# Patient Record
Sex: FEMALE | Race: WHITE | NOT HISPANIC OR LATINO | Employment: OTHER | URBAN - METROPOLITAN AREA
[De-identification: names, ages, dates, MRNs, and addresses within clinical notes are randomized per-mention and may not be internally consistent; named-entity substitution may affect disease eponyms.]

---

## 2017-01-05 ENCOUNTER — ALLSCRIPTS OFFICE VISIT (OUTPATIENT)
Dept: OTHER | Facility: OTHER | Age: 82
End: 2017-01-05

## 2017-05-24 ENCOUNTER — LAB CONVERSION - ENCOUNTER (OUTPATIENT)
Dept: OTHER | Facility: OTHER | Age: 82
End: 2017-05-24

## 2017-05-24 LAB
25(OH)D3 SERPL-MCNC: 35 NG/ML (ref 30–100)
A/G RATIO (HISTORICAL): 1.6 (CALC) (ref 1–2.5)
ALBUMIN SERPL BCP-MCNC: 4.5 G/DL (ref 3.6–5.1)
ALP SERPL-CCNC: 77 U/L (ref 33–130)
ALT SERPL W P-5'-P-CCNC: 11 U/L (ref 6–29)
AST SERPL W P-5'-P-CCNC: 17 U/L (ref 10–35)
BASOPHILS # BLD AUTO: 0.6 %
BASOPHILS # BLD AUTO: 50 CELLS/UL (ref 0–200)
BILIRUB SERPL-MCNC: 0.5 MG/DL (ref 0.2–1.2)
BUN SERPL-MCNC: 17 MG/DL (ref 7–25)
BUN/CREA RATIO (HISTORICAL): 18 (CALC) (ref 6–22)
CALCIUM SERPL-MCNC: 9.8 MG/DL (ref 8.6–10.4)
CHLORIDE SERPL-SCNC: 104 MMOL/L (ref 98–110)
CHOLEST SERPL-MCNC: 283 MG/DL (ref 125–200)
CHOLEST/HDLC SERPL: 4 (CALC)
CO2 SERPL-SCNC: 26 MMOL/L (ref 20–31)
CREAT SERPL-MCNC: 0.97 MG/DL (ref 0.6–0.88)
DEPRECATED RDW RBC AUTO: 13.3 % (ref 11–15)
EGFR AFRICAN AMERICAN (HISTORICAL): 64 ML/MIN/1.73M2
EGFR-AMERICAN CALC (HISTORICAL): 55 ML/MIN/1.73M2
EOSINOPHIL # BLD AUTO: 1.1 %
EOSINOPHIL # BLD AUTO: 92 CELLS/UL (ref 15–500)
GAMMA GLOBULIN (HISTORICAL): 2.8 G/DL (CALC) (ref 1.9–3.7)
GLUCOSE (HISTORICAL): 101 MG/DL (ref 65–99)
HCT VFR BLD AUTO: 44 % (ref 35–45)
HDLC SERPL-MCNC: 70 MG/DL
HGB BLD-MCNC: 14.2 G/DL (ref 11.7–15.5)
LDL CHOLESTEROL (HISTORICAL): 189 MG/DL (CALC)
LYMPHOCYTES # BLD AUTO: 2167 CELLS/UL (ref 850–3900)
LYMPHOCYTES # BLD AUTO: 25.8 %
MCH RBC QN AUTO: 30.1 PG (ref 27–33)
MCHC RBC AUTO-ENTMCNC: 32.3 G/DL (ref 32–36)
MCV RBC AUTO: 93.2 FL (ref 80–100)
MONOCYTES # BLD AUTO: 521 CELLS/UL (ref 200–950)
MONOCYTES (HISTORICAL): 6.2 %
NEUTROPHILS # BLD AUTO: 5569 CELLS/UL (ref 1500–7800)
NEUTROPHILS # BLD AUTO: 66.3 %
NON-HDL-CHOL (CHOL-HDL) (HISTORICAL): 213 MG/DL (CALC)
PLATELET # BLD AUTO: 212 THOUSAND/UL (ref 140–400)
PMV BLD AUTO: 10.5 FL (ref 7.5–12.5)
POTASSIUM SERPL-SCNC: 4.4 MMOL/L (ref 3.5–5.3)
RBC # BLD AUTO: 4.73 MILLION/UL (ref 3.8–5.1)
SODIUM SERPL-SCNC: 140 MMOL/L (ref 135–146)
TOTAL PROTEIN (HISTORICAL): 7.3 G/DL (ref 6.1–8.1)
TRIGL SERPL-MCNC: 119 MG/DL
TSH SERPL DL<=0.05 MIU/L-ACNC: 1.26 MIU/L (ref 0.4–4.5)
WBC # BLD AUTO: 8.4 THOUSAND/UL (ref 3.8–10.8)

## 2017-06-05 DIAGNOSIS — M85.80 OTHER SPECIFIED DISORDERS OF BONE DENSITY AND STRUCTURE, UNSPECIFIED SITE: ICD-10-CM

## 2017-06-05 DIAGNOSIS — E78.5 HYPERLIPIDEMIA: ICD-10-CM

## 2017-06-05 DIAGNOSIS — I10 ESSENTIAL (PRIMARY) HYPERTENSION: ICD-10-CM

## 2017-06-05 DIAGNOSIS — Z13.21 ENCOUNTER FOR SCREENING FOR NUTRITIONAL DISORDER: ICD-10-CM

## 2017-07-12 ENCOUNTER — ALLSCRIPTS OFFICE VISIT (OUTPATIENT)
Dept: OTHER | Facility: OTHER | Age: 82
End: 2017-07-12

## 2017-08-22 ENCOUNTER — APPOINTMENT (OUTPATIENT)
Dept: LAB | Facility: MEDICAL CENTER | Age: 82
End: 2017-08-22
Payer: MEDICARE

## 2017-08-22 DIAGNOSIS — E78.5 HYPERLIPIDEMIA: ICD-10-CM

## 2017-08-22 LAB
CHOLEST SERPL-MCNC: 246 MG/DL (ref 50–200)
HDLC SERPL-MCNC: 66 MG/DL (ref 40–60)
LDLC SERPL CALC-MCNC: 159 MG/DL (ref 0–100)
TRIGL SERPL-MCNC: 104 MG/DL

## 2017-08-22 PROCEDURE — 80061 LIPID PANEL: CPT

## 2017-08-22 PROCEDURE — 36415 COLL VENOUS BLD VENIPUNCTURE: CPT

## 2017-09-01 DIAGNOSIS — E78.5 HYPERLIPIDEMIA: ICD-10-CM

## 2017-10-26 ENCOUNTER — ALLSCRIPTS OFFICE VISIT (OUTPATIENT)
Dept: OTHER | Facility: OTHER | Age: 82
End: 2017-10-26

## 2017-10-26 DIAGNOSIS — F17.200 NICOTINE DEPENDENCE, UNCOMPLICATED: ICD-10-CM

## 2017-10-27 NOTE — PROGRESS NOTES
Assessment  1  Carotid artery stenosis (433 10) (I65 29)   2  Hyperlipidemia (272 4) (E78 5)   3  Hypertension (401 9) (I10)    Plan  Carotid ultrasound  CT scan lung  Recheck 6 months  Discussion/Summary    1  Hypertension-controlledHyperlipidemia-did show significant decrease in her cholesterol and LDL with her dietary changes  Will continue to monitor  Cannot cannot tolerate statins  Carotid artery disease-due for ultrasoundCigarette smoker-advised to quit  Discussed CT lung cancer screening  HM-declines immunizations  Chief Complaint  The patient is here today for a follow up  History of Present Illness  George Avery says she richa been trying to watch her diet to lower cholesterol   She cut out ice cream and baked goods  She is eating oatmeal everyday  Lost some weight  She stays very active  is seeing a  and enjoying this relationship very much  Has been dancing smoking  Active Problems  1  Anaphylaxis Due To Bee Venom (995 0)   2  Arthritis of knee (716 96) (M17 10)   3  Carotid artery stenosis (433 10) (I65 29)   4  Carotid bruit (785 9) (R09 89)   5  Cerebral arterial aneurysm (437 3) (I67 1)   6  Cognitive complaints (799 59) (R41 9)   7  Contact dermatitis due to plant (692 6) (L25 5)   8  Eczema (692 9) (L30 9)   9  Encounter for screening mammogram for malignant neoplasm of breast (V76 12)   (Z12 31)   10  Glaucoma screening (V80 1) (Z13 5)   11  Hyperlipidemia (272 4) (E78 5)   12  Hypertension (401 9) (I10)   13  Osteopenia (733 90) (M85 80)   14  Poison ivy dermatitis (692 6) (L23 7)   15  Right knee pain (719 46) (M25 561)   16  Skin rash (782 1) (R21)   17  Unspecified Muscle Strain (848 9)    Past Medical History  1  History of Encounter for vitamin deficiency screening (V77 99) (Z13 21)   2  History of Thyroid disorder screen (V77 0) (Z13 29)    Surgical History  1  History of Colonoscopy (Fiberoptic)    Family History  Mother    1   Family history of Family Health Status Of Mother -    2  Family history of Heart Disease (V17 49)  Father    3  Family history of Family Health Status Of Father -     Social History   · Denied: History of Alcohol Use (History)   · Caffeine Use   · Current every day smoker (305 1) (F17 200)    Current Meds   1  AmLODIPine Besylate 10 MG Oral Tablet; TAKE 1 TABLET DAILY; Therapy: 46VAH3654 to (Tara Dyer)  Requested for: 53EXH9495; Last   Rx:[id number] Ordered   2  Aspirin EC Lo-Dose 81 MG TBEC; Take 1 tablet daily Recorded   3  Caltrate 600 Plus-Vit D TABS; Take as directed; Therapy: (Orlean Art) to Recorded   4  HM Fish Oil 1200 MG Oral Capsule; Take as directed Recorded   5  Multivitamins Oral Capsule; TAKE 1 CAPSULE Daily; Therapy: (Recorded:2014) to Recorded    Allergies  1  Ezetimibe TABS   2  Codeine Sulfate TABS   3  Penicillins  4  Bee sting    Vitals  Vital Signs    Recorded: 42IPT2616 01:59PM   Heart Rate 64   Respiration 18   Systolic 578   Diastolic 64   Weight 773 lb 4 oz   BMI Calculated 24 96   BSA Calculated 1 6     Physical Exam    Constitutional   General appearance: No acute distress, well appearing and well nourished  Neck   Neck: Supple, symmetric, trachea midline, no masses  Thyroid: Normal, no thyromegaly  Pulmonary   Respiratory effort: Abnormal  -- Decreased breath sounds  Auscultation of lungs: Clear to auscultation  Cardiovascular   Auscultation of heart: Normal rate and rhythm, normal S1 and S2, no murmurs  Results/Data  (1) LIPID PANEL, FASTING [id number] 10:23AM Dot Smith Order Number: [id number]     Test Name Result Flag Reference   CHOLESTEROL 246 mg/dL H    HDL,DIRECT 66 mg/dL H 40-60   Specimen collection should occur prior to Metamizole administration due to the potential for falsley depressed results     LDL CHOLESTEROL CALCULATED 159 mg/dL H 0-100   Triglyceride:        Normal ??? ??? ??? ??? ??? ??? ??? <150 mg/dl   ??? ??? ???Borderline High ??? ??? 150-199 mg/dl   ??? ??? ? ?? High ??? ??? ??? ??? ??? ??? ??? 200-499 mg/dl   ??? ??? ? ??Very High ??? ??? ??? ??? ??? >499 mg/dl      Cholesterol:       Desirable ??? ??? ??? ??? <200 mg/dl   ??? ??? Borderline High ??? 200-239 mg/dl   ??? ??? High ??? ??? ??? ??? ??? ??? >239 mg/dl      HDL Cholesterol:       High ??? ???>59 mg/dL   ??? ??? Low ??? ??? <41 mg/dL      This screening LDL is a calculated result  It does not have the accuracy of the Direct Measured LDL in the monitoring of patients with hyperlipidemia and/or statin therapy  Direct Measure LDL (GEN628) must be ordered separately in these patients  TRIGLYCERIDES 104 mg/dL  <=150   Specimen collection should occur prior to N-Acetylcysteine or Metamizole administration due to the potential for falsely depressed results  Future Appointments    Date/Time Provider Specialty Site   04/26/2018 02:00 PM SOHAIL Arora   [address]     Signatures   Electronically signed by : SOHAIL Adkins ; Oct 26 2017 10:31PM EST                       (Author)

## 2017-11-14 ENCOUNTER — ALLSCRIPTS OFFICE VISIT (OUTPATIENT)
Dept: OTHER | Facility: OTHER | Age: 82
End: 2017-11-14

## 2017-11-15 NOTE — PROGRESS NOTES
Assessment    1  Nasal congestion (478 19) (R09 81)    Plan  Nasal congestion    · Follow-up PRN Evaluation and Treatment  Follow-up  Status: Complete  Done:14Nov2017    Discussion/Summary    Suspect allergies based on symptoms and exam  Recommended patient avoid decongestants such as Sudafed  Recommended patient start Claritin 10 mg daily  Follow-up in 2 weeks if symptoms persist or sooner if needed  Possible side effects of new medications were reviewed with the patient/guardian today  The treatment plan was reviewed with the patient/guardian  The patient/guardian understands and agrees with the treatment plan      Chief Complaint  Patient is here today with complaints of nasal symptoms and a cough  Symptoms started a few weeks ago but does not seem to be getting better  History of Present Illness  HPI: Patient with a couple week history of runny nose, sneezing, cough, nasal congestion and watery eyes  Patient states she has been in and out of the house doing yard work and housework  Review of Systems   Constitutional: no fever  Cardiovascular: no chest pain  Respiratory: no shortness of breath  Active Problems    1  Anaphylaxis Due To Bee Venom (995 0)   2  Arthritis of knee (716 96) (M17 10)   3  Carotid artery stenosis (433 10) (I65 29)   4  Carotid bruit (785 9) (R09 89)   5  Cerebral arterial aneurysm (437 3) (I67 1)   6  Cognitive complaints (799 59) (R41 9)   7  Contact dermatitis due to plant (692 6) (L25 5)   8  Eczema (692 9) (L30 9)   9  Encounter for screening mammogram for malignant neoplasm of breast (V76 12) (Z12 31)   10  Glaucoma screening (V80 1) (Z13 5)   11  Hyperlipidemia (272 4) (E78 5)   12  Hypertension (401 9) (I10)   13  Osteopenia (733 90) (M85 80)   14  Poison ivy dermatitis (692 6) (L23 7)   15  Right knee pain (719 46) (M25 561)   16  Skin rash (782 1) (R21)   17  Unspecified Muscle Strain (848 9)    Past Medical History  1   History of Encounter for vitamin deficiency screening (V77 99) (Z13 21)   2  History of Thyroid disorder screen (V77 0) (Z13 29)  Active Problems And Past Medical History Reviewed: The active problems and past medical history were reviewed and updated today  Family History  Mother    1  Family history of Family Health Status Of Mother -    2  Family history of Heart Disease (V17 49)  Father    3  Family history of Family Health Status Of Father -     Social History   · Denied: History of Alcohol Use (History)   · Caffeine Use   · Current every day smoker (305 1) (F17 200)  The social history was reviewed and updated today  Surgical History    1  History of Colonoscopy (Fiberoptic)    Current Meds   1  AmLODIPine Besylate 10 MG Oral Tablet; TAKE 1 TABLET DAILY; Therapy: 46CHX8293 to (87 89 79)  Requested for: 88GQP9500; Last Rx:2017 Ordered   2  Aspirin EC Lo-Dose 81 MG TBEC; Take 1 tablet daily Recorded   3  Caltrate 600 Plus-Vit D TABS; Take as directed; Therapy: (Liam Lucero) to Recorded   4  HM Fish Oil 1200 MG Oral Capsule; Take as directed Recorded   5  Multivitamins Oral Capsule; TAKE 1 CAPSULE Daily; Therapy: (Recorded:2014) to Recorded    The medication list was reviewed and updated today  Allergies  1  Ezetimibe TABS   2  Codeine Sulfate TABS   3  Penicillins  4  Bee sting    Vitals   Recorded: 62FAF1155 01:34PM   Heart Rate 68   Respiration 18   Systolic 834   Diastolic 70   Weight 835 lb    BMI Calculated 24 91   BSA Calculated 1 6   O2 Saturation 96       Physical Exam   Constitutional  General appearance: No acute distress, well appearing and well nourished     Ears, Nose, Mouth, and Throat  External inspection of ears and nose: Normal    Otoscopic examination: Abnormal   The right tympanic membrane was normal  The left tympanic membrane was normal  The right external canal was normal  The left external canal was normal  Exam of the right middle ear showed a middle ear effusion that was serous  Exam of the left middle ear showed a middle ear effusion that was serous  Nasal mucosa, septum, and turbinates: Abnormal   There was clear rhinorrhea from both nares  The bilateral nasal mucosa was edematous, but-- not pale/blue-- and-- not red  Oropharynx: Abnormal   The posterior pharynx Postnasal drainage  , but-- was not erythematous-- and-- did not have an exudate  Pulmonary  Respiratory effort: No increased work of breathing or signs of respiratory distress  Auscultation of lungs: Clear to auscultation  Cardiovascular  Auscultation of heart: Normal rate and rhythm, normal S1 and S2, without murmurs  Examination of extremities for edema and/or varicosities: Normal    Lymphatic  Palpation of lymph nodes in neck: No lymphadenopathy  Musculoskeletal  Gait and station: Normal          Future Appointments    Date/Time Provider Specialty Site   04/26/2018 02:00 PM SOHAIL Kruse   [address] [facility]       Signatures   Electronically signed by : Vic Walker DO; Nov 14 2017  2:00PM EST                       (Author)

## 2018-01-10 NOTE — RESULT NOTES
Message   Right knee arthritis  F/U w/ PCP as recommended  Verified Results  * XR KNEE 3 VIEW RIGHT 13Jun2016 11:49AM Tara Oakley Order Number: [id number]     Test Name Result Flag Reference   XR KNEE 3 VW RIGHT (Report)     RIGHT KNEE     INDICATION: Right knee pain  COMPARISON: None     VIEWS: 3; 3 images     FINDINGS:     There is no acute fracture or dislocation  There is no joint effusion  Miniscule superior patellar osteophyte  No lytic or blastic lesions are seen  Soft tissues are unremarkable  IMPRESSION:     No acute osseous abnormality  Minimal degenerative changes         Workstation performed: [id number]     Signed by:   Nora Marti, DO   6/13/16

## 2018-01-12 VITALS
WEIGHT: 134.25 LBS | RESPIRATION RATE: 18 BRPM | HEART RATE: 64 BPM | DIASTOLIC BLOOD PRESSURE: 64 MMHG | SYSTOLIC BLOOD PRESSURE: 128 MMHG | BODY MASS INDEX: 24.16 KG/M2

## 2018-01-13 VITALS
BODY MASS INDEX: 24.91 KG/M2 | DIASTOLIC BLOOD PRESSURE: 62 MMHG | HEART RATE: 86 BPM | WEIGHT: 134 LBS | RESPIRATION RATE: 18 BRPM | SYSTOLIC BLOOD PRESSURE: 124 MMHG

## 2018-01-13 VITALS
BODY MASS INDEX: 25.99 KG/M2 | HEIGHT: 62 IN | HEART RATE: 90 BPM | SYSTOLIC BLOOD PRESSURE: 130 MMHG | WEIGHT: 141.25 LBS | DIASTOLIC BLOOD PRESSURE: 80 MMHG | RESPIRATION RATE: 18 BRPM

## 2018-01-13 VITALS
OXYGEN SATURATION: 96 % | BODY MASS INDEX: 24.91 KG/M2 | RESPIRATION RATE: 18 BRPM | HEART RATE: 68 BPM | WEIGHT: 134 LBS | DIASTOLIC BLOOD PRESSURE: 70 MMHG | SYSTOLIC BLOOD PRESSURE: 136 MMHG

## 2018-02-16 ENCOUNTER — TELEPHONE (OUTPATIENT)
Dept: FAMILY MEDICINE CLINIC | Facility: MEDICAL CENTER | Age: 83
End: 2018-02-16

## 2018-02-16 ENCOUNTER — HOSPITAL ENCOUNTER (INPATIENT)
Facility: HOSPITAL | Age: 83
LOS: 3 days | Discharge: HOME HEALTH | DRG: 346 | End: 2018-02-19
Attending: EMERGENCY MEDICINE | Admitting: SURGERY
Payer: MEDICARE

## 2018-02-16 ENCOUNTER — APPOINTMENT (EMERGENCY)
Dept: CT IMAGING | Facility: HOSPITAL | Age: 83
DRG: 346 | End: 2018-02-16
Payer: MEDICARE

## 2018-02-16 DIAGNOSIS — F17.200 SMOKING: ICD-10-CM

## 2018-02-16 DIAGNOSIS — I10 ESSENTIAL HYPERTENSION: Chronic | ICD-10-CM

## 2018-02-16 DIAGNOSIS — K61.1 PERIRECTAL ABSCESS: ICD-10-CM

## 2018-02-16 DIAGNOSIS — A41.9 SEPSIS (HCC): ICD-10-CM

## 2018-02-16 DIAGNOSIS — K61.0 PERIANAL ABSCESS: Primary | ICD-10-CM

## 2018-02-16 LAB
ABO GROUP BLD: NORMAL
ALBUMIN SERPL BCP-MCNC: 3.4 G/DL (ref 3.5–5)
ALP SERPL-CCNC: 78 U/L (ref 46–116)
ALT SERPL W P-5'-P-CCNC: 14 U/L (ref 12–78)
ANION GAP SERPL CALCULATED.3IONS-SCNC: 11 MMOL/L (ref 4–13)
APTT PPP: 28 SECONDS (ref 23–35)
AST SERPL W P-5'-P-CCNC: 13 U/L (ref 5–45)
BACTERIA UR QL AUTO: ABNORMAL /HPF
BASOPHILS # BLD AUTO: 0.04 THOUSANDS/ΜL (ref 0–0.1)
BASOPHILS NFR BLD AUTO: 0 % (ref 0–1)
BILIRUB SERPL-MCNC: 0.4 MG/DL (ref 0.2–1)
BILIRUB UR QL STRIP: NEGATIVE
BLD GP AB SCN SERPL QL: NEGATIVE
BUN SERPL-MCNC: 15 MG/DL (ref 5–25)
CALCIUM SERPL-MCNC: 9.1 MG/DL (ref 8.3–10.1)
CHLORIDE SERPL-SCNC: 103 MMOL/L (ref 100–108)
CLARITY UR: CLEAR
CO2 SERPL-SCNC: 26 MMOL/L (ref 21–32)
COLOR UR: YELLOW
CREAT SERPL-MCNC: 1.07 MG/DL (ref 0.6–1.3)
EOSINOPHIL # BLD AUTO: 0.01 THOUSAND/ΜL (ref 0–0.61)
EOSINOPHIL NFR BLD AUTO: 0 % (ref 0–6)
ERYTHROCYTE [DISTWIDTH] IN BLOOD BY AUTOMATED COUNT: 12.6 % (ref 11.6–15.1)
GFR SERPL CREATININE-BSD FRML MDRD: 49 ML/MIN/1.73SQ M
GLUCOSE SERPL-MCNC: 118 MG/DL (ref 65–140)
GLUCOSE UR STRIP-MCNC: NEGATIVE MG/DL
HCT VFR BLD AUTO: 41.9 % (ref 34.8–46.1)
HGB BLD-MCNC: 13.3 G/DL (ref 11.5–15.4)
HGB UR QL STRIP.AUTO: ABNORMAL
INR PPP: 0.99 (ref 0.86–1.16)
KETONES UR STRIP-MCNC: NEGATIVE MG/DL
LACTATE SERPL-SCNC: 0.9 MMOL/L (ref 0.5–2)
LEUKOCYTE ESTERASE UR QL STRIP: NEGATIVE
LYMPHOCYTES # BLD AUTO: 0.91 THOUSANDS/ΜL (ref 0.6–4.47)
LYMPHOCYTES NFR BLD AUTO: 5 % (ref 14–44)
MCH RBC QN AUTO: 29.6 PG (ref 26.8–34.3)
MCHC RBC AUTO-ENTMCNC: 31.7 G/DL (ref 31.4–37.4)
MCV RBC AUTO: 93 FL (ref 82–98)
MONOCYTES # BLD AUTO: 1.19 THOUSAND/ΜL (ref 0.17–1.22)
MONOCYTES NFR BLD AUTO: 7 % (ref 4–12)
NEUTROPHILS # BLD AUTO: 14.57 THOUSANDS/ΜL (ref 1.85–7.62)
NEUTS SEG NFR BLD AUTO: 87 % (ref 43–75)
NITRITE UR QL STRIP: POSITIVE
NON-SQ EPI CELLS URNS QL MICRO: ABNORMAL /HPF
NRBC BLD AUTO-RTO: 0 /100 WBCS
PH UR STRIP.AUTO: 5.5 [PH] (ref 4.5–8)
PLATELET # BLD AUTO: 178 THOUSANDS/UL (ref 149–390)
PLATELET # BLD AUTO: 199 THOUSANDS/UL (ref 149–390)
PMV BLD AUTO: 10.9 FL (ref 8.9–12.7)
PMV BLD AUTO: 11.3 FL (ref 8.9–12.7)
POTASSIUM SERPL-SCNC: 3.6 MMOL/L (ref 3.5–5.3)
PROT SERPL-MCNC: 7.2 G/DL (ref 6.4–8.2)
PROT UR STRIP-MCNC: ABNORMAL MG/DL
PROTHROMBIN TIME: 13.3 SECONDS (ref 12.1–14.4)
RBC # BLD AUTO: 4.49 MILLION/UL (ref 3.81–5.12)
RBC #/AREA URNS AUTO: ABNORMAL /HPF
RH BLD: POSITIVE
SODIUM SERPL-SCNC: 140 MMOL/L (ref 136–145)
SP GR UR STRIP.AUTO: <=1.005 (ref 1–1.03)
SPECIMEN EXPIRATION DATE: NORMAL
TROPONIN I SERPL-MCNC: <0.02 NG/ML
UROBILINOGEN UR QL STRIP.AUTO: 0.2 E.U./DL
WBC # BLD AUTO: 16.8 THOUSAND/UL (ref 4.31–10.16)
WBC #/AREA URNS AUTO: ABNORMAL /HPF

## 2018-02-16 PROCEDURE — 86850 RBC ANTIBODY SCREEN: CPT | Performed by: EMERGENCY MEDICINE

## 2018-02-16 PROCEDURE — 96375 TX/PRO/DX INJ NEW DRUG ADDON: CPT

## 2018-02-16 PROCEDURE — 99285 EMERGENCY DEPT VISIT HI MDM: CPT

## 2018-02-16 PROCEDURE — 85049 AUTOMATED PLATELET COUNT: CPT | Performed by: SURGERY

## 2018-02-16 PROCEDURE — 96365 THER/PROPH/DIAG IV INF INIT: CPT

## 2018-02-16 PROCEDURE — 84484 ASSAY OF TROPONIN QUANT: CPT | Performed by: EMERGENCY MEDICINE

## 2018-02-16 PROCEDURE — 83605 ASSAY OF LACTIC ACID: CPT | Performed by: EMERGENCY MEDICINE

## 2018-02-16 PROCEDURE — 85610 PROTHROMBIN TIME: CPT | Performed by: EMERGENCY MEDICINE

## 2018-02-16 PROCEDURE — 87040 BLOOD CULTURE FOR BACTERIA: CPT | Performed by: EMERGENCY MEDICINE

## 2018-02-16 PROCEDURE — 72193 CT PELVIS W/DYE: CPT

## 2018-02-16 PROCEDURE — 86900 BLOOD TYPING SEROLOGIC ABO: CPT | Performed by: EMERGENCY MEDICINE

## 2018-02-16 PROCEDURE — 86901 BLOOD TYPING SEROLOGIC RH(D): CPT | Performed by: EMERGENCY MEDICINE

## 2018-02-16 PROCEDURE — 81001 URINALYSIS AUTO W/SCOPE: CPT | Performed by: EMERGENCY MEDICINE

## 2018-02-16 PROCEDURE — 93005 ELECTROCARDIOGRAM TRACING: CPT | Performed by: EMERGENCY MEDICINE

## 2018-02-16 PROCEDURE — 36415 COLL VENOUS BLD VENIPUNCTURE: CPT | Performed by: EMERGENCY MEDICINE

## 2018-02-16 PROCEDURE — 85025 COMPLETE CBC W/AUTO DIFF WBC: CPT | Performed by: EMERGENCY MEDICINE

## 2018-02-16 PROCEDURE — 96367 TX/PROPH/DG ADDL SEQ IV INF: CPT

## 2018-02-16 PROCEDURE — 85730 THROMBOPLASTIN TIME PARTIAL: CPT | Performed by: EMERGENCY MEDICINE

## 2018-02-16 PROCEDURE — 80053 COMPREHEN METABOLIC PANEL: CPT | Performed by: EMERGENCY MEDICINE

## 2018-02-16 PROCEDURE — 96366 THER/PROPH/DIAG IV INF ADDON: CPT

## 2018-02-16 RX ORDER — MORPHINE SULFATE 2 MG/ML
2 INJECTION, SOLUTION INTRAMUSCULAR; INTRAVENOUS ONCE
Status: DISCONTINUED | OUTPATIENT
Start: 2018-02-16 | End: 2018-02-19 | Stop reason: HOSPADM

## 2018-02-16 RX ORDER — MORPHINE SULFATE 2 MG/ML
2 INJECTION, SOLUTION INTRAMUSCULAR; INTRAVENOUS
Status: DISCONTINUED | OUTPATIENT
Start: 2018-02-16 | End: 2018-02-19 | Stop reason: HOSPADM

## 2018-02-16 RX ORDER — DEXTROSE, SODIUM CHLORIDE, AND POTASSIUM CHLORIDE 5; .45; .15 G/100ML; G/100ML; G/100ML
75 INJECTION INTRAVENOUS CONTINUOUS
Status: DISCONTINUED | OUTPATIENT
Start: 2018-02-16 | End: 2018-02-17

## 2018-02-16 RX ORDER — NICOTINE 21 MG/24HR
14 PATCH, TRANSDERMAL 24 HOURS TRANSDERMAL ONCE
Status: COMPLETED | OUTPATIENT
Start: 2018-02-16 | End: 2018-02-17

## 2018-02-16 RX ORDER — AMLODIPINE BESYLATE 10 MG/1
10 TABLET ORAL DAILY
COMMUNITY
End: 2018-05-12 | Stop reason: SDUPTHER

## 2018-02-16 RX ORDER — ONDANSETRON 2 MG/ML
4 INJECTION INTRAMUSCULAR; INTRAVENOUS ONCE
Status: COMPLETED | OUTPATIENT
Start: 2018-02-16 | End: 2018-02-16

## 2018-02-16 RX ORDER — HEPARIN SODIUM 5000 [USP'U]/ML
5000 INJECTION, SOLUTION INTRAVENOUS; SUBCUTANEOUS EVERY 8 HOURS SCHEDULED
Status: DISCONTINUED | OUTPATIENT
Start: 2018-02-16 | End: 2018-02-19 | Stop reason: HOSPADM

## 2018-02-16 RX ORDER — ASPIRIN 81 MG/1
81 TABLET ORAL DAILY
COMMUNITY

## 2018-02-16 RX ORDER — ONDANSETRON 2 MG/ML
4 INJECTION INTRAMUSCULAR; INTRAVENOUS EVERY 6 HOURS PRN
Status: DISCONTINUED | OUTPATIENT
Start: 2018-02-16 | End: 2018-02-19 | Stop reason: HOSPADM

## 2018-02-16 RX ORDER — LEVOFLOXACIN 5 MG/ML
750 INJECTION, SOLUTION INTRAVENOUS ONCE
Status: COMPLETED | OUTPATIENT
Start: 2018-02-16 | End: 2018-02-17

## 2018-02-16 RX ADMIN — VANCOMYCIN HYDROCHLORIDE 750 MG: 750 INJECTION, SOLUTION INTRAVENOUS at 18:50

## 2018-02-16 RX ADMIN — HEPARIN SODIUM 5000 UNITS: 5000 INJECTION, SOLUTION INTRAVENOUS; SUBCUTANEOUS at 22:47

## 2018-02-16 RX ADMIN — LEVOFLOXACIN 750 MG: 5 INJECTION, SOLUTION INTRAVENOUS at 20:28

## 2018-02-16 RX ADMIN — DEXTROSE, SODIUM CHLORIDE, AND POTASSIUM CHLORIDE 75 ML/HR: 5; .45; .15 INJECTION INTRAVENOUS at 22:47

## 2018-02-16 RX ADMIN — SODIUM CHLORIDE 1000 ML: 0.9 INJECTION, SOLUTION INTRAVENOUS at 17:02

## 2018-02-16 RX ADMIN — IOHEXOL 100 ML: 350 INJECTION, SOLUTION INTRAVENOUS at 17:49

## 2018-02-16 RX ADMIN — ONDANSETRON 4 MG: 2 INJECTION INTRAMUSCULAR; INTRAVENOUS at 17:02

## 2018-02-16 RX ADMIN — FAMOTIDINE 10 MG: 10 INJECTION, SOLUTION INTRAVENOUS at 22:47

## 2018-02-16 RX ADMIN — NICOTINE 14 MG: 14 PATCH TRANSDERMAL at 19:13

## 2018-02-16 RX ADMIN — METRONIDAZOLE 500 MG: 500 INJECTION, SOLUTION INTRAVENOUS at 17:15

## 2018-02-16 NOTE — SEPSIS NOTE
Sepsis Note   Mary Jo Chacko 81 y o  female MRN: [id number]  Unit/Bed#: ED 06 Encounter: [id number]            Initial Sepsis Screening     [address] Name 02/16/18 1733                Is the patient's history suggestive of a new or worsening infection? (!)  Yes (Proceed)  -SZ        Suspected source of infection soft tissue  -SZ        Are two or more of the following signs & symptoms of infection both present and new to the patient? (!)  Yes (Proceed)  -SZ        Indicate SIRS criteria Leukocytosis (WBC > 06636 IJL); Tachycardia > 90 bpm  -SZ        If the answer is yes to both questions, suspicion of sepsis is present  --        If severe sepsis is present AND tissue hypoperfusion perists in the hour after fluid resuscitation or lactate > 4, the patient meets criteria for SEPTIC SHOCK  --        Are any of the following organ dysfunction criteria present within 6 hours of suspected infection and SIRS criteria that are NOT considered to be chronic conditions?  No  -SZ        Organ dysfunction  --        Date of presentation of severe sepsis  --        Time of presentation of severe sepsis  --        Tissue hypoperfusion persists in the hour after crystalloid fluid administration, evidenced, by either:  --        Was hypotension present within one hour of the conclusion of crystalloid fluid administration?  --        Date of presentation of septic shock  --        Time of presentation of septic shock  --          User Key  (r) = Recorded By, (t) = Taken By, (c) = Cosigned By    [address] Name Provider Type    SZ Virginia Dancer, DO Physician

## 2018-02-16 NOTE — TELEPHONE ENCOUNTER
fyi-  S/w Shruthi  C/o large painful lump of the vaginal area  Aware Dr Nidia Bergeron is not here and this may need to be lanced    Should go to the Er for assessment, they can call in a surgeon if needed

## 2018-02-16 NOTE — ED PROVIDER NOTES
History  Chief Complaint   Patient presents with    Perineal Abscess     Pt states " This boil on my vagina is getting big"  pt states the "Boil" has been there for a week  79-year-old female with a history of hypertension presenting with chief complaint of vaginal pain  She is here with family reports over last week she has had increasing pain on her left labia, described as sore radiates posteriorly into her buttocks, worse when she touches it sits on it, better with rest she has been trying Epson salt and soaks without improvement of her symptoms, it got acutely worse in the last day or so she developed shaking chills earlier today decreased appetite fatigue and presents today for evaluation of left vaginal pain she otherwise denies history of diabetes history of similar poor wound healing easy bleeding or bruising denies headache chest pain cough shortness of breath abdominal flank or back pain urinary symptoms pain with defecation blood in her stool joint pain swelling rashes or other associated symptoms  Complete review systems otherwise negative as noted            Prior to Admission Medications   Prescriptions Last Dose Informant Patient Reported? Taking? amLODIPine (NORVASC) 10 mg tablet 2/15/2018 at 0900  Yes Yes   Sig: Take 10 mg by mouth daily   aspirin (ECOTRIN LOW STRENGTH) 81 mg EC tablet 2/15/2018 at 0900  Yes Yes   Sig: Take 81 mg by mouth daily   ibuprofen (ADVIL,MOTRIN) 100 MG tablet Past Week at Unknown time  Yes Yes   Sig: Take 100 mg by mouth every 6 (six) hours as needed for mild pain      Facility-Administered Medications: None       Past Medical History:   Diagnosis Date    Hypertension        History reviewed  No pertinent surgical history  History reviewed  No pertinent family history  I have reviewed and agree with the history as documented      Social History   Substance Use Topics    Smoking status: Current Every Day Smoker     Packs/day: 1 00     Types: Cigarettes    Smokeless tobacco: Never Used    Alcohol use No        Review of Systems   Constitutional: Positive for chills, fatigue and fever  Negative for activity change and appetite change  HENT: Negative for rhinorrhea and sore throat  Eyes: Negative for photophobia and pain  Respiratory: Negative for cough and shortness of breath  Cardiovascular: Negative for chest pain and palpitations  Gastrointestinal: Positive for rectal pain and vomiting  Negative for abdominal pain, blood in stool, diarrhea and nausea  Genitourinary: Positive for genital sores  Negative for dysuria, flank pain, frequency and urgency  Musculoskeletal: Negative for arthralgias, back pain and myalgias  Skin: Negative for color change and rash  Neurological: Negative for dizziness, weakness, light-headedness and headaches  Hematological: Negative for adenopathy  Does not bruise/bleed easily  Psychiatric/Behavioral: Negative for agitation and behavioral problems  All other systems reviewed and are negative  Physical Exam  ED Triage Vitals [02/16/18 1537]   Temperature Pulse Respirations Blood Pressure SpO2   99 9 °F (37 7 °C) 95 19 (!) 183/74 98 %      Temp Source Heart Rate Source Patient Position - Orthostatic VS BP Location FiO2 (%)   Oral Monitor Sitting Left arm --      Pain Score       7           Orthostatic Vital Signs  Vitals:    02/17/18 1000 02/17/18 1115 02/17/18 1130 02/17/18 1142   BP: 123/59 114/55 139/60 125/59   Pulse: 68 77 67 68   Patient Position - Orthostatic VS:           Physical Exam   Constitutional: She is oriented to person, place, and time  She appears well-developed and well-nourished  No distress  Well-appearing sitting upright conversational no acute distress family bedside   HENT:   Head: Normocephalic and atraumatic  Eyes: EOM are normal  Pupils are equal, round, and reactive to light  Neck: Normal range of motion  Neck supple  No tracheal deviation present     Cardiovascular: Normal rate, regular rhythm and normal heart sounds  Exam reveals no gallop and no friction rub  No murmur heard  Pulmonary/Chest: Effort normal and breath sounds normal  She has no wheezes  She has no rales  Abdominal: Soft  Bowel sounds are normal  She exhibits no distension  There is no tenderness  There is no rebound and no guarding  Genitourinary:   Genitourinary Comments: Patient has abscess redness induration of left labia majora extending onto the perineum and at 3:00 position to left zeinab anal area no active drainage no other surrounding erythema abscess or other acute infectious findings no crepitus no bullae, Cherelle pts nurse present for exam   Musculoskeletal: Normal range of motion  She exhibits no edema or tenderness  Neurological: She is alert and oriented to person, place, and time  No cranial nerve deficit  She exhibits normal muscle tone  Coordination normal    Skin: Skin is warm and dry  No rash noted  Psychiatric: She has a normal mood and affect  Her behavior is normal    Nursing note and vitals reviewed        ED Medications  Medications   morphine injection 2 mg ( Intravenous MAR Hold 2/17/18 0959)   nicotine (NICODERM CQ) 14 mg/24hr TD 24 hr patch 14 mg (14 mg Transdermal Medication Applied 2/16/18 1913)   ondansetron (ZOFRAN) injection 4 mg ( Intravenous MAR Hold 2/17/18 0959)   nicotine (NICODERM CQ) 7 mg/24hr TD 24 hr patch 1 patch ( Transdermal Dose Auto Held 2/20/18 0900)   heparin (porcine) subcutaneous injection 5,000 Units ( Subcutaneous Dose Auto Held 2/22/18 2200)   famotidine (PEPCID) injection 10 mg ( Intravenous Dose Auto Held 2/22/18 1800)   morphine injection 2 mg ( Intravenous MAR Hold 2/17/18 0959)   acetaminophen (TYLENOL) tablet 650 mg ( Oral MAR Hold 2/17/18 0959)   sodium chloride 0 9 % bolus 1,000 mL (1,000 mL Intravenous New Bag 2/16/18 1702)   ondansetron (ZOFRAN) injection 4 mg (4 mg Intravenous Given 2/16/18 1702)   vancomycin (VANCOCIN) IVPB (premix) 750 mg (0 mg/kg × 54 4 kg Intravenous Stopped 2/16/18 2046)   levofloxacin (LEVAQUIN) IVPB (premix) 750 mg (750 mg Intravenous New Bag 2/16/18 2028)   metroNIDAZOLE (FLAGYL) IVPB (premix) 500 mg (0 mg Intravenous Stopped 2/16/18 1849)   iohexol (OMNIPAQUE) 350 MG/ML injection (MULTI-DOSE) 100 mL (100 mL Intravenous Given 2/16/18 1749)       Diagnostic Studies  Results Reviewed     Procedure Component Value Units Date/Time    Comprehensive metabolic panel [[id number]]  (Abnormal) Collected:  02/17/18 0445    Lab Status:  Final result Specimen:  Blood from Hand, Left Updated:  02/17/18 0543     Sodium 140 mmol/L      Potassium 4 0 mmol/L      Chloride 107 mmol/L      CO2 25 mmol/L      Anion Gap 8 mmol/L      BUN 11 mg/dL      Creatinine 1 02 mg/dL      Glucose 125 mg/dL      Calcium 8 8 mg/dL      AST 10 U/L      ALT 12 U/L      Alkaline Phosphatase 63 U/L      Total Protein 6 2 (L) g/dL      Albumin 2 7 (L) g/dL      Total Bilirubin 0 60 mg/dL      eGFR 52 ml/min/1 73sq m     Narrative:         National Kidney Disease Education Program recommendations are as follows:  GFR calculation is accurate only with a steady state creatinine  Chronic Kidney disease less than 60 ml/min/1 73 sq  meters  Kidney failure less than 15 ml/min/1 73 sq  meters      Magnesium [[id number]]  (Normal) Collected:  02/17/18 0445    Lab Status:  Final result Specimen:  Blood from Hand, Left Updated:  02/17/18 0534     Magnesium 1 9 mg/dL     Phosphorus [[id number]]  (Normal) Collected:  02/17/18 0445    Lab Status:  Final result Specimen:  Blood from Hand, Left Updated:  02/17/18 0534     Phosphorus 3 3 mg/dL     CBC and differential [[id number]]  (Abnormal) Collected:  02/17/18 0445    Lab Status:  Final result Specimen:  Blood from Hand, Left Updated:  02/17/18 0517     WBC 22 07 (H) Thousand/uL      RBC 3 87 Million/uL      Hemoglobin 11 6 g/dL      Hematocrit 36 5 %      MCV 94 fL      MCH 30 0 pg      MCHC 31 8 g/dL      RDW 12 8 %      MPV 11 5 fL Platelets 615 Thousands/uL      nRBC 0 /100 WBCs      Neutrophils Relative 81 (H) %      Lymphocytes Relative 10 (L) %      Monocytes Relative 8 %      Eosinophils Relative 0 %      Basophils Relative 0 %      Neutrophils Absolute 17 88 (H) Thousands/µL      Lymphocytes Absolute 2 10 Thousands/µL      Monocytes Absolute 1 83 (H) Thousand/µL      Eosinophils Absolute 0 00 Thousand/µL      Basophils Absolute 0 04 Thousands/µL     Platelet count [[id number]]  (Normal) Collected:  02/16/18 2125    Lab Status:  Final result Specimen:  Blood from Arm, Right Updated:  02/16/18 2129     Platelets 808 Thousands/uL      MPV 10 9 fL     Urine Microscopic [[id number]]  (Abnormal) Collected:  02/16/18 1915    Lab Status:  Final result Specimen:  Urine from Urine, Clean Catch Updated:  02/16/18 1937     RBC, UA 10-20 (A) /hpf      WBC, UA 0-1 (A) /hpf      Epithelial Cells Occasional /hpf      Bacteria, UA Moderate (A) /hpf     UA w Reflex to Microscopic w Reflex to Culture [[id number]]  (Abnormal) Collected:  02/16/18 1915    Lab Status:  Final result Specimen:  Urine from Urine, Clean Catch Updated:  02/16/18 1923     Color, UA Yellow     Clarity, UA Clear     Specific Gravity, UA <=1 005     pH, UA 5 5     Leukocytes, UA Negative     Nitrite, UA Positive (A)     Protein, UA Trace (A) mg/dl      Glucose, UA Negative mg/dl      Ketones, UA Negative mg/dl      Urobilinogen, UA 0 2 E U /dl      Bilirubin, UA Negative     Blood, UA Moderate (A)    Lactic acid, plasma [[id number]]  (Normal) Collected:  02/16/18 1653    Lab Status:  Final result Specimen:  Blood from Arm, Left Updated:  02/16/18 1726     LACTIC ACID 0 9 mmol/L     Narrative:         Result may be elevated if tourniquet was used during collection  APTT [[id number]]  (Normal) Collected:  02/16/18 1653    Lab Status:  Final result Specimen:  Blood from Arm, Right Updated:  02/16/18 1726     PTT 28 seconds     Narrative:          Therapeutic Heparin Range = 60-90 seconds Troponin I [[id number]]  (Normal) Collected:  02/16/18 1653    Lab Status:  Final result Specimen:  Blood from Arm, Right Updated:  02/16/18 1725     Troponin I <0 02 ng/mL     Narrative:         [employer] Chemistry analyzer 99% cutoff is > 0 04 ng/mL in network labs    o cTnI 99% cutoff is useful only when applied to patients in the clinical setting of myocardial ischemia  o cTnI 99% cutoff should be interpreted in the context of clinical history, ECG findings and possibly cardiac imaging to establish correct diagnosis  o cTnI 99% cutoff may be suggestive but clearly not indicative of a coronary event without the clinical setting of myocardial ischemia  Comprehensive metabolic panel [[id number]]  (Abnormal) Collected:  02/16/18 1653    Lab Status:  Final result Specimen:  Blood from Arm, Right Updated:  02/16/18 1721     Sodium 140 mmol/L      Potassium 3 6 mmol/L      Chloride 103 mmol/L      CO2 26 mmol/L      Anion Gap 11 mmol/L      BUN 15 mg/dL      Creatinine 1 07 mg/dL      Glucose 118 mg/dL      Calcium 9 1 mg/dL      AST 13 U/L      ALT 14 U/L      Alkaline Phosphatase 78 U/L      Total Protein 7 2 g/dL      Albumin 3 4 (L) g/dL      Total Bilirubin 0 40 mg/dL      eGFR 49 ml/min/1 73sq m     Narrative:         National Kidney Disease Education Program recommendations are as follows:  GFR calculation is accurate only with a steady state creatinine  Chronic Kidney disease less than 60 ml/min/1 73 sq  meters  Kidney failure less than 15 ml/min/1 73 sq  meters      Jesus Mendiola [[id number]]  (Normal) Collected:  02/16/18 1653    Lab Status:  Final result Specimen:  Blood from Arm, Right Updated:  02/16/18 1717     Protime 13 3 seconds      INR 0 99    CBC and differential [[id number]]  (Abnormal) Collected:  02/16/18 1653    Lab Status:  Final result Specimen:  Blood from Arm, Right Updated:  02/16/18 1704     WBC 16 80 (H) Thousand/uL      RBC 4 49 Million/uL      Hemoglobin 13 3 g/dL      Hematocrit 41 9 %      MCV 93 fL      MCH 29 6 pg      MCHC 31 7 g/dL      RDW 12 6 %      MPV 11 3 fL      Platelets 041 Thousands/uL      nRBC 0 /100 WBCs      Neutrophils Relative 87 (H) %      Lymphocytes Relative 5 (L) %      Monocytes Relative 7 %      Eosinophils Relative 0 %      Basophils Relative 0 %      Neutrophils Absolute 14 57 (H) Thousands/µL      Lymphocytes Absolute 0 91 Thousands/µL      Monocytes Absolute 1 19 Thousand/µL      Eosinophils Absolute 0 01 Thousand/µL      Basophils Absolute 0 04 Thousands/µL     Blood culture #2 [[id number]] Collected:  02/16/18 1653    Lab Status: In process Specimen:  Blood from Arm, Left Updated:  02/16/18 1701    Blood culture #1 [[id number]] Collected:  02/16/18 1653    Lab Status: In process Specimen:  Blood from Arm, Right Updated:  02/16/18 1700                 CT pelvis w contrast   Final Result by Silvana Murphy MD (02/16 1834)      1   2 9 x 1 1 cm left perianal abscess  Soft tissue stranding and gas extending inferiorly from the perianal abscess into the left perineal region raises concern for developing Hipolito gangrene or developing fistula formation  2   Mild circumferential bladder wall thickening may be reactive  Correlate with UA for cystitis               I personally discussed this study with ELISABETH Damon on 2/16/2018 at 6:31 PM                Workstation performed: [id number]                    Procedures  Procedures       Phone Contacts  ED Phone Contact    ED Course  ED Course as of Feb 17 1158 Fri Feb 16, 2018   1736 Patient'sEKG reviewed normal sinus rhythm 87 beats per minute normal intervals normal access no acute ST segment changes change from prior EKG in 2001    1844 D /w rads, surgery paged, pt agreebale to transfer if needed, family updated received abx     1853 D/w surgery pt has had pain for approximately 1 week, worse today, reviewed CT scan and physical findings with Dr Rhiannon Danielson, agreeable to admitting patient here at this facility, pt improved, agreeable to admission    1942 Nitrite, UA: (!) Positive                         Initial Sepsis Screening     Row Name 02/16/18 5485                Is the patient's history suggestive of a new or worsening infection? (!)  Yes (Proceed)  -SZ        Suspected source of infection soft tissue  -SZ        Are two or more of the following signs & symptoms of infection both present and new to the patient? (!)  Yes (Proceed)  -SZ        Indicate SIRS criteria Leukocytosis (WBC > 08590 IJL); Tachycardia > 90 bpm  -SZ        If the answer is yes to both questions, suspicion of sepsis is present  --        If severe sepsis is present AND tissue hypoperfusion perists in the hour after fluid resuscitation or lactate > 4, the patient meets criteria for SEPTIC SHOCK  --        Are any of the following organ dysfunction criteria present within 6 hours of suspected infection and SIRS criteria that are NOT considered to be chronic conditions?  No  -SZ        Organ dysfunction  --        Date of presentation of severe sepsis  --        Time of presentation of severe sepsis  --        Tissue hypoperfusion persists in the hour after crystalloid fluid administration, evidenced, by either:  --        Was hypotension present within one hour of the conclusion of crystalloid fluid administration?  --        Date of presentation of septic shock  --        Time of presentation of septic shock  --          User Key  (r) = Recorded By, (t) = Taken By, (c) = Cosigned By    [address] Name Provider Type    SAMMIE Lopez DO Physician                  MDM  Number of Diagnoses or Management Options  Perianal abscess:   Perirectal abscess:   Sepsis [facility]):   Diagnosis management comments: 60-year-old female with history of hypertension with approximately 1 week of pain localized to her left labium and buttock area, worse over last day or so now shaking chills on exam she is tachycardic otherwise normal vital signs she is very well-appearing sitting upright pleasant conversational no acute distress patient's female nurse Juan Jose Mccall was present for exam she has induration and erythema from her left perianal region extending onto the posterior aspect of her left labia majora, without other acute findings, will place IV IV fluids, blood cultures lactic septic workup and preop labs, will perform CT scan of the pelvis with IV contrast to assess for collection, will give early antibiotics, discussed with surgery, plan admit    CritCare Time    Disposition  Final diagnoses:   Perianal abscess   Sepsis ([address] )   Perirectal abscess     Time reflects when diagnosis was documented in both MDM as applicable and the Disposition within this note     Time User Action Codes Description Comment    2/16/2018  7:04 PM Mariea Cogan W Add [K61 0] Perianal abscess     2/16/2018  7:04 PM Mariea Cogan W Add [A41 9] [facility]     2/16/2018  7:59 PM Anabella Steele, [address] [place] [address] Essential hypertension     2/16/2018  7:59 PM Octavio Chirinos 98 Essential hypertension     2/16/2018  7:59 PM Octavio Chirinos 98 Essential hypertension     2/16/2018  8:45 PM Mariea Cogan W Add [K61 1] Perirectal abscess     2/17/2018 11:05 AM Rodrick GRIGSBY Modify [K61 0] Perianal abscess       ED Disposition     ED Disposition Condition Comment    Admit  Case was discussed with [facility] and the patient's admission status was agreed to be Admission Status: inpatient status to the service of Dr Anabella Steele Surgery           Follow-up Information     Follow up With Specialties Details Why Contact Info    Roya Booker MD General Surgery Follow up in 2 week(s)  [address]  [facility]  [phone]          Current Discharge Medication List      CONTINUE these medications which have NOT CHANGED    Details   amLODIPine (NORVASC) 10 mg tablet Take 10 mg by mouth daily      aspirin (ECOTRIN LOW STRENGTH) 81 mg EC tablet Take 81 mg by mouth daily      ibuprofen (ADVIL,MOTRIN) 100 MG tablet Take 100 mg by mouth every 6 (six) hours as needed for mild pain           No discharge procedures on file      ED Provider  Electronically Signed by           Konstantin House DO  02/17/18 9029

## 2018-02-17 ENCOUNTER — ANESTHESIA EVENT (INPATIENT)
Dept: PERIOP | Facility: HOSPITAL | Age: 83
DRG: 346 | End: 2018-02-17
Payer: MEDICARE

## 2018-02-17 ENCOUNTER — ANESTHESIA (INPATIENT)
Dept: PERIOP | Facility: HOSPITAL | Age: 83
DRG: 346 | End: 2018-02-17
Payer: MEDICARE

## 2018-02-17 LAB
ALBUMIN SERPL BCP-MCNC: 2.7 G/DL (ref 3.5–5)
ALP SERPL-CCNC: 63 U/L (ref 46–116)
ALT SERPL W P-5'-P-CCNC: 12 U/L (ref 12–78)
ANION GAP SERPL CALCULATED.3IONS-SCNC: 8 MMOL/L (ref 4–13)
AST SERPL W P-5'-P-CCNC: 10 U/L (ref 5–45)
BASOPHILS # BLD AUTO: 0.04 THOUSANDS/ΜL (ref 0–0.1)
BASOPHILS NFR BLD AUTO: 0 % (ref 0–1)
BILIRUB SERPL-MCNC: 0.6 MG/DL (ref 0.2–1)
BUN SERPL-MCNC: 11 MG/DL (ref 5–25)
CALCIUM SERPL-MCNC: 8.8 MG/DL (ref 8.3–10.1)
CHLORIDE SERPL-SCNC: 107 MMOL/L (ref 100–108)
CO2 SERPL-SCNC: 25 MMOL/L (ref 21–32)
CREAT SERPL-MCNC: 1.02 MG/DL (ref 0.6–1.3)
EOSINOPHIL # BLD AUTO: 0 THOUSAND/ΜL (ref 0–0.61)
EOSINOPHIL NFR BLD AUTO: 0 % (ref 0–6)
ERYTHROCYTE [DISTWIDTH] IN BLOOD BY AUTOMATED COUNT: 12.8 % (ref 11.6–15.1)
GFR SERPL CREATININE-BSD FRML MDRD: 52 ML/MIN/1.73SQ M
GLUCOSE SERPL-MCNC: 125 MG/DL (ref 65–140)
HCT VFR BLD AUTO: 36.5 % (ref 34.8–46.1)
HGB BLD-MCNC: 11.6 G/DL (ref 11.5–15.4)
LYMPHOCYTES # BLD AUTO: 2.1 THOUSANDS/ΜL (ref 0.6–4.47)
LYMPHOCYTES NFR BLD AUTO: 10 % (ref 14–44)
MAGNESIUM SERPL-MCNC: 1.9 MG/DL (ref 1.6–2.6)
MCH RBC QN AUTO: 30 PG (ref 26.8–34.3)
MCHC RBC AUTO-ENTMCNC: 31.8 G/DL (ref 31.4–37.4)
MCV RBC AUTO: 94 FL (ref 82–98)
MONOCYTES # BLD AUTO: 1.83 THOUSAND/ΜL (ref 0.17–1.22)
MONOCYTES NFR BLD AUTO: 8 % (ref 4–12)
NEUTROPHILS # BLD AUTO: 17.88 THOUSANDS/ΜL (ref 1.85–7.62)
NEUTS SEG NFR BLD AUTO: 81 % (ref 43–75)
NRBC BLD AUTO-RTO: 0 /100 WBCS
PHOSPHATE SERPL-MCNC: 3.3 MG/DL (ref 2.3–4.1)
PLATELET # BLD AUTO: 182 THOUSANDS/UL (ref 149–390)
PMV BLD AUTO: 11.5 FL (ref 8.9–12.7)
POTASSIUM SERPL-SCNC: 4 MMOL/L (ref 3.5–5.3)
PROT SERPL-MCNC: 6.2 G/DL (ref 6.4–8.2)
RBC # BLD AUTO: 3.87 MILLION/UL (ref 3.81–5.12)
SODIUM SERPL-SCNC: 140 MMOL/L (ref 136–145)
WBC # BLD AUTO: 22.07 THOUSAND/UL (ref 4.31–10.16)

## 2018-02-17 PROCEDURE — 87070 CULTURE OTHR SPECIMN AEROBIC: CPT | Performed by: SURGERY

## 2018-02-17 PROCEDURE — 87185 SC STD ENZYME DETCJ PER NZM: CPT | Performed by: SURGERY

## 2018-02-17 PROCEDURE — 84100 ASSAY OF PHOSPHORUS: CPT | Performed by: SURGERY

## 2018-02-17 PROCEDURE — 87205 SMEAR GRAM STAIN: CPT | Performed by: SURGERY

## 2018-02-17 PROCEDURE — 46040 I&D ISCHIORCT&/PERIRCT ABSC: CPT | Performed by: SURGERY

## 2018-02-17 PROCEDURE — 99232 SBSQ HOSP IP/OBS MODERATE 35: CPT | Performed by: NURSE PRACTITIONER

## 2018-02-17 PROCEDURE — 83735 ASSAY OF MAGNESIUM: CPT | Performed by: SURGERY

## 2018-02-17 PROCEDURE — 0D9P0ZZ DRAINAGE OF RECTUM, OPEN APPROACH: ICD-10-PCS | Performed by: SURGERY

## 2018-02-17 PROCEDURE — 85025 COMPLETE CBC W/AUTO DIFF WBC: CPT | Performed by: SURGERY

## 2018-02-17 PROCEDURE — 87075 CULTR BACTERIA EXCEPT BLOOD: CPT | Performed by: SURGERY

## 2018-02-17 PROCEDURE — 99223 1ST HOSP IP/OBS HIGH 75: CPT | Performed by: SURGERY

## 2018-02-17 PROCEDURE — 80053 COMPREHEN METABOLIC PANEL: CPT | Performed by: SURGERY

## 2018-02-17 RX ORDER — ACETAMINOPHEN 325 MG/1
650 TABLET ORAL EVERY 6 HOURS PRN
Status: DISCONTINUED | OUTPATIENT
Start: 2018-02-17 | End: 2018-02-19 | Stop reason: HOSPADM

## 2018-02-17 RX ORDER — PROPOFOL 10 MG/ML
INJECTION, EMULSION INTRAVENOUS CONTINUOUS PRN
Status: DISCONTINUED | OUTPATIENT
Start: 2018-02-17 | End: 2018-02-17 | Stop reason: SURG

## 2018-02-17 RX ORDER — SODIUM CHLORIDE, SODIUM LACTATE, POTASSIUM CHLORIDE, CALCIUM CHLORIDE 600; 310; 30; 20 MG/100ML; MG/100ML; MG/100ML; MG/100ML
INJECTION, SOLUTION INTRAVENOUS CONTINUOUS PRN
Status: DISCONTINUED | OUTPATIENT
Start: 2018-02-17 | End: 2018-02-17 | Stop reason: SURG

## 2018-02-17 RX ORDER — CIPROFLOXACIN 2 MG/ML
400 INJECTION, SOLUTION INTRAVENOUS EVERY 12 HOURS
Status: DISCONTINUED | OUTPATIENT
Start: 2018-02-17 | End: 2018-02-19

## 2018-02-17 RX ORDER — PROPOFOL 10 MG/ML
INJECTION, EMULSION INTRAVENOUS AS NEEDED
Status: DISCONTINUED | OUTPATIENT
Start: 2018-02-17 | End: 2018-02-17 | Stop reason: SURG

## 2018-02-17 RX ORDER — DOCUSATE SODIUM 100 MG/1
100 CAPSULE, LIQUID FILLED ORAL 2 TIMES DAILY
Status: DISCONTINUED | OUTPATIENT
Start: 2018-02-17 | End: 2018-02-19 | Stop reason: HOSPADM

## 2018-02-17 RX ORDER — FENTANYL CITRATE/PF 50 MCG/ML
25 SYRINGE (ML) INJECTION
Status: DISCONTINUED | OUTPATIENT
Start: 2018-02-17 | End: 2018-02-17 | Stop reason: HOSPADM

## 2018-02-17 RX ORDER — ONDANSETRON 2 MG/ML
4 INJECTION INTRAMUSCULAR; INTRAVENOUS ONCE AS NEEDED
Status: DISCONTINUED | OUTPATIENT
Start: 2018-02-17 | End: 2018-02-17 | Stop reason: HOSPADM

## 2018-02-17 RX ORDER — LIDOCAINE HYDROCHLORIDE 10 MG/ML
INJECTION, SOLUTION EPIDURAL; INFILTRATION; INTRACAUDAL; PERINEURAL AS NEEDED
Status: DISCONTINUED | OUTPATIENT
Start: 2018-02-17 | End: 2018-02-17 | Stop reason: HOSPADM

## 2018-02-17 RX ORDER — OXYCODONE HYDROCHLORIDE AND ACETAMINOPHEN 5; 325 MG/1; MG/1
1 TABLET ORAL EVERY 6 HOURS PRN
Status: DISCONTINUED | OUTPATIENT
Start: 2018-02-17 | End: 2018-02-19 | Stop reason: HOSPADM

## 2018-02-17 RX ADMIN — SODIUM CHLORIDE, SODIUM LACTATE, POTASSIUM CHLORIDE, AND CALCIUM CHLORIDE: .6; .31; .03; .02 INJECTION, SOLUTION INTRAVENOUS at 10:48

## 2018-02-17 RX ADMIN — HEPARIN SODIUM 5000 UNITS: 5000 INJECTION, SOLUTION INTRAVENOUS; SUBCUTANEOUS at 22:11

## 2018-02-17 RX ADMIN — METRONIDAZOLE 500 MG: 500 INJECTION, SOLUTION INTRAVENOUS at 19:22

## 2018-02-17 RX ADMIN — HEPARIN SODIUM 5000 UNITS: 5000 INJECTION, SOLUTION INTRAVENOUS; SUBCUTANEOUS at 05:52

## 2018-02-17 RX ADMIN — PROPOFOL 50 MG: 10 INJECTION, EMULSION INTRAVENOUS at 10:58

## 2018-02-17 RX ADMIN — CIPROFLOXACIN 400 MG: 2 INJECTION, SOLUTION INTRAVENOUS at 18:27

## 2018-02-17 RX ADMIN — PROPOFOL 60 MCG/KG/MIN: 10 INJECTION, EMULSION INTRAVENOUS at 10:55

## 2018-02-17 RX ADMIN — OXYCODONE HYDROCHLORIDE AND ACETAMINOPHEN 1 TABLET: 5; 325 TABLET ORAL at 13:49

## 2018-02-17 RX ADMIN — HEPARIN SODIUM 5000 UNITS: 5000 INJECTION, SOLUTION INTRAVENOUS; SUBCUTANEOUS at 15:12

## 2018-02-17 RX ADMIN — FAMOTIDINE 10 MG: 10 INJECTION, SOLUTION INTRAVENOUS at 08:52

## 2018-02-17 RX ADMIN — DOCUSATE SODIUM 100 MG: 100 CAPSULE, LIQUID FILLED ORAL at 17:59

## 2018-02-17 RX ADMIN — FAMOTIDINE 10 MG: 10 INJECTION, SOLUTION INTRAVENOUS at 18:00

## 2018-02-17 RX ADMIN — PROPOFOL 50 MG: 10 INJECTION, EMULSION INTRAVENOUS at 10:55

## 2018-02-17 RX ADMIN — ACETAMINOPHEN 650 MG: 325 TABLET, FILM COATED ORAL at 00:23

## 2018-02-17 RX ADMIN — NICOTINE 1 PATCH: 7 PATCH TRANSDERMAL at 08:32

## 2018-02-17 NOTE — ANESTHESIA POSTPROCEDURE EVALUATION
Post-Op Assessment Note      CV Status:  Stable    Mental Status:  Alert and awake    Hydration Status:  Euvolemic    PONV Controlled:  Controlled    Airway Patency:  Patent    Post Op Vitals Reviewed: Yes          Staff: CRNA, Anesthesiologist           BP   114/55   Temp   98 6   Pulse  98   Resp   16   SpO2   98

## 2018-02-17 NOTE — ASSESSMENT & PLAN NOTE
· Present on admission  · Care per primary team  · Patient to OR today for I and D  · Continue vancomycin  · Continue pain management per primary team  · Continue regular diet  · Monitor patient for fever

## 2018-02-17 NOTE — OP NOTE
OPERATIVE REPORT  PATIENT NAME: Mary Marcial    :  1935  MRN: [id number]  Pt Location: [facility] OR ROOM 04    SURGERY DATE: 2018    Surgeon(s) and Role:     * Haydee Painter MD - Primary    Preop Diagnosis:  Perianal abscess [K61 0]    Post-Op Diagnosis Codes:     * Perianal abscess [K61 0]    Procedure(s) (LRB):  INCISION AND DRAINAGE (I&D) PERIRECTAL (N/A)    Specimen(s):  ID Type Source Tests Collected by Time Destination   A : left perirectal abcess Wound Rectum ANAEROBIC CULTURE AND GRAM STAIN, WOUND CULTURE Haydee Painter MD 2018 1104        Estimated Blood Loss:   Minimal    Drains:       Anesthesia Type:   IV Sedation with Anesthesia    Operative Indications:  Perianal abscess [K61 0]  81 year female presented to the emergency room complaining of perirectal abscess for 1 week  CT scan showed findings consistent with perirectal abscess on the left side  The patient was advised to undergo incision and drainage of the abscess  Operative Findings:  Cavity measured approximately 5 cm, multilobulated  Moderate amount of pus  Complications:   None    Procedure and Technique:  The patient was identified and she was placed in the operating table in a supine position  After adequate IV sedation she was repositioned in the lithotomy position  The perirectal area was prepped and draped in a sterile usual fashion with Betadine solution  Time-out was called the patient was identified as was surgical site  1% lidocaine plain was infiltrated over the lump of the left perirectal area  An elliptical incision was made with a scalpel, taken down through the subcutaneous tissue with scalpel dissection  The abscess cavity was entered and breaking down using blunt finger dissection  Once that was accomplished the wound was irrigated  At this point proceeded to pack the wound with 1 inch packing  And dressings were applied  At the end of the case instrument, needles, and sponges counts were correct    The patient tolerated the procedure well     I was present for the entire procedure and A qualified resident physician was not available    Patient Disposition:  PACU     SIGNATURE: Edmundo Reese MD  DATE: February 17, 2018  TIME: 11:06 AM

## 2018-02-17 NOTE — H&P
H&P Exam - General Surgery   Xu Andre 81 y o  female MRN: [id number]  Unit/Bed#: [facility] Encounter: [id number]    Assessment/Plan     Assessment:  Perirectal abscess for 1 week  Plan:  Advised the patient to undergo incision and drainage of perirectal abscess  History of Present Illness     HPI:  Xu Andre is a 81 y o  female who presents with perirectal lump, pain, redness 1 week  The pain got progressively worse in the last couple days  She came into the emergency room because she was having shaking chills  Denies having any fever  No problems going to the bathroom  Review of Systems   Constitutional: Positive for chills  Negative for fever  HENT: Negative for nosebleeds and sore throat  Eyes: Negative for discharge and redness  Respiratory: Negative for cough and shortness of breath  Cardiovascular: Negative for chest pain and palpitations  Gastrointestinal: Negative for abdominal pain, constipation and diarrhea  Endocrine: Negative for cold intolerance and heat intolerance  Genitourinary: Negative for dysuria and hematuria  Neurological: Negative for seizures and headaches  Hematological: Negative for adenopathy  Does not bruise/bleed easily  Historical Information   Past Medical History:   Diagnosis Date    Hypertension      History reviewed  No pertinent surgical history  Social History   History   Alcohol Use No     History   Drug Use No     History   Smoking Status    Current Every Day Smoker    Packs/day: 1 00    Types: Cigarettes   Smokeless Tobacco    Never Used     Family History: History reviewed  No pertinent family history  Meds/Allergies   PTA meds:   Prior to Admission Medications   Prescriptions Last Dose Informant Patient Reported? Taking?    amLODIPine (NORVASC) 10 mg tablet 2/15/2018 at 0900  Yes Yes   Sig: Take 10 mg by mouth daily   aspirin (ECOTRIN LOW STRENGTH) 81 mg EC tablet 2/15/2018 at 0900  Yes Yes   Sig: Take 81 mg by mouth daily ibuprofen (ADVIL,MOTRIN) 100 MG tablet Past Week at Unknown time  Yes Yes   Sig: Take 100 mg by mouth every 6 (six) hours as needed for mild pain      Facility-Administered Medications: None     Allergies   Allergen Reactions    Influenza Vaccines     Penicillins Rash       Objective   First Vitals:   Blood Pressure: (!) 183/74 (02/16/18 1537)  Pulse: 95 (02/16/18 1537)  Temperature: 99 9 °F (37 7 °C) (02/16/18 1537)  Temp Source: Oral (02/16/18 1537)  Respirations: 19 (02/16/18 1537)  Height: 5' 3" (160 cm) (02/16/18 1537)  Weight - Scale: 54 4 kg (120 lb) (02/16/18 1537)  SpO2: 98 % (02/16/18 1537)    Current Vitals:   Blood Pressure: 123/59 (02/17/18 1000)  Pulse: 68 (02/17/18 1000)  Temperature: 98 7 °F (37 1 °C) (02/17/18 1000)  Temp Source: Tympanic (02/17/18 1000)  Respirations: 22 (02/17/18 1000)  Height: 5' 3" (160 cm) (02/16/18 1537)  Weight - Scale: 54 4 kg (120 lb) (02/16/18 1537)  SpO2: 96 % (02/17/18 1000)      Intake/Output Summary (Last 24 hours) at 02/17/18 1006  Last data filed at 02/17/18 0957   Gross per 24 hour   Intake           603 33 ml   Output              525 ml   Net            78 33 ml       Invasive Devices     Peripheral Intravenous Line            Peripheral IV 02/16/18 Right Antecubital less than 1 day                Physical Exam   Constitutional: She is oriented to person, place, and time  She appears well-developed and well-nourished  No distress  HENT:   Head: Normocephalic  Mouth/Throat: No oropharyngeal exudate  Eyes: Pupils are equal, round, and reactive to light  No scleral icterus  Neck: Normal range of motion  Neck supple  Cardiovascular: Normal rate and regular rhythm  No murmur heard  Pulmonary/Chest: Effort normal and breath sounds normal    Abdominal: Soft  Bowel sounds are normal    Genitourinary:   Genitourinary Comments: Rectal exam revealed a left perirectal abscess, 6 cm, tender with erythema without drainage     Musculoskeletal: Normal range of motion  She exhibits no edema  Lymphadenopathy:     She has no cervical adenopathy  Neurological: She is alert and oriented to person, place, and time  No cranial nerve deficit  Psychiatric: She has a normal mood and affect  Her behavior is normal        Lab Results:   CBC:   Lab Results   Component Value Date    WBC 22 07 (H) 02/17/2018    HGB 11 6 02/17/2018    HCT 36 5 02/17/2018    MCV 94 02/17/2018     02/17/2018    MCH 30 0 02/17/2018    MCHC 31 8 02/17/2018    RDW 12 8 02/17/2018    MPV 11 5 02/17/2018    NRBC 0 02/17/2018   , CMP:   Lab Results   Component Value Date     02/17/2018    K 4 0 02/17/2018     02/17/2018    CO2 25 02/17/2018    ANIONGAP 8 02/17/2018    BUN 11 02/17/2018    CREATININE 1 02 02/17/2018    GLUCOSE 125 02/17/2018    CALCIUM 8 8 02/17/2018    AST 10 02/17/2018    ALT 12 02/17/2018    ALKPHOS 63 02/17/2018    PROT 6 2 (L) 02/17/2018    BILITOT 0 60 02/17/2018    EGFR 52 02/17/2018   , Coagulation:   Lab Results   Component Value Date    INR 0 99 02/16/2018   , Urinalysis:   Lab Results   Component Value Date    COLORU Yellow 02/16/2018    CLARITYU Clear 02/16/2018    SPECGRAV <=1 005 02/16/2018    PHUR 5 5 02/16/2018    LEUKOCYTESUR Negative 02/16/2018    NITRITE Positive (A) 02/16/2018    PROTEINUA Trace (A) 02/16/2018    GLUCOSEU Negative 02/16/2018    KETONESU Negative 02/16/2018    BILIRUBINUR Negative 02/16/2018    BLOODU Moderate (A) 02/16/2018   , Amylase: No results found for: AMYLASE, Lipase: No results found for: LIPASE  Imaging: I have personally reviewed pertinent reports  EKG, Pathology, and Other Studies: I have personally reviewed pertinent films in PACS  Findings from CT scan of the pelvis   FINDINGS:    VISUALIZED KIDNEYS/URETERS:  No significant abnormality identified in the partially imaged kidneys and ureters  REPRODUCTIVE ORGANS:  Status post hysterectomy   Vaginal cuff is unremarkable      URINARY BLADDER:  Mild circumferential urinary bladder wall thickening  APPENDIX:  No findings to suggest appendicitis  VISUALIZED BOWEL:  Colonic diverticulosis without diverticulitis  2 9 x 1 1 cm left perianal abscess  Extending inferiorly from the abscess, there is inflammatory stranding and gas locules extending into the left perineum  ABDOMINOPELVIC CAVITY:  No ascites or free intraperitoneal air  No lymphadenopathy      VISUALIZED VESSELS:  Aortic atherosclerosis      ABDOMINOPELVIC WALL/INGUINAL REGIONS: Unremarkable  OSSEOUS STRUCTURES:  No acute fracture or destructive osseous lesion  Impression:       1   2 9 x 1 1 cm left perianal abscess  Soft tissue stranding and gas extending inferiorly from the perianal abscess into the left perineal region raises concern for developing Hipolito gangrene or developing fistula formation      2   Mild circumferential bladder wall thickening may be reactive   Correlate with UA for cystitis         Code Status: Level 1 - Full Code

## 2018-02-17 NOTE — ANESTHESIA PREPROCEDURE EVALUATION
Review of Systems/Medical History  Patient summary reviewed  Chart reviewed  No history of anesthetic complications     Cardiovascular  Hyperlipidemia, Hypertension controlled, CAD, ,    Pulmonary  Smoker cigarette smoker  , Shortness of breath,        GI/Hepatic  Negative GI/hepatic ROS          Negative  ROS        Endo/Other  Negative endo/other ROS      GYN       Hematology  Negative hematology ROS     Comment: Cerebral aneurysm  Was clipped in the past  Unaware of date  Musculoskeletal    Arthritis     Neurology  Negative neurology ROS      Psychology   Negative psychology ROS              Physical Exam    Airway    Mallampati score: II  TM Distance: >3 FB  Neck ROM: full     Dental   Comment: Poor dentition  Full dentures top and partial on bottom  ,     Cardiovascular  Cardiovascular exam normal    Pulmonary  Pulmonary exam normal     Other Findings        Anesthesia Plan  ASA Score- 3     Anesthesia Type- IV sedation with anesthesia with ASA Monitors  Additional Monitors:   Airway Plan:         Plan Factors-  Patient did not smoke on day of surgery  Induction- intravenous  Postoperative Plan- Plan for postoperative opioid use  Informed Consent- Anesthetic plan and risks discussed with patient

## 2018-02-17 NOTE — PLAN OF CARE
DISCHARGE PLANNING     Discharge to home or other facility with appropriate resources Progressing        INFECTION - ADULT     Absence or prevention of progression during hospitalization Progressing     Absence of fever/infection during neutropenic period Progressing        Knowledge Deficit     Patient/family/caregiver demonstrates understanding of disease process, treatment plan, medications, and discharge instructions Progressing        METABOLIC, FLUID AND ELECTROLYTES - ADULT     Electrolytes maintained within normal limits Progressing     Fluid balance maintained Progressing     Glucose maintained within target range Progressing        PAIN - ADULT     Verbalizes/displays adequate comfort level or baseline comfort level Progressing        Potential for Falls     Patient will remain free of falls Progressing        SAFETY ADULT     Maintain or return to baseline ADL function Progressing     Maintain or return mobility status to optimal level Progressing        SKIN/TISSUE INTEGRITY - ADULT     Skin integrity remains intact Progressing     Incision(s), wounds(s) or drain site(s) healing without S/S of infection Progressing     Oral mucous membranes remain intact Progressing

## 2018-02-17 NOTE — CONSULTS
Consult- Neal Rivas 1935, 81 y o  female MRN: [id number]    Unit/Bed#: -01 Encounter: [id number]    Primary Care Provider: Lina Gay MD   Date and time admitted to hospital: 2/16/2018  4:26 PM      Consults    * Perirectal abscess   Assessment & Plan    · Present on admission  · Care per primary team  · Patient to OR today for I and D  · Continue vancomycin  · Continue pain management per primary team  · Continue regular diet  · Monitor patient for fever         Essential hypertension   Assessment & Plan    · Blood pressure soft today likely status post anesthesia from OR otherwise has been acceptable  · Can resume Norvasc in a m  · Blood pressure is otherwise stable  · Slim will sign off call with questions            VTE Prophylaxis: Reason for no pharmacologic prophylaxis Due to surgical intervention  / sequential compression device     Recommendations for Discharge:  · Continue Norvasc upon discharge    Counseling / Coordination of Care Time: 37 minutes  Greater than 50% of total time spent on patient counseling and coordination of care  Collaboration of Care: Were Recommendations Directly Discussed with Primary Treatment Team? - No     History of Present Illness:    Neal Rivas is a 81 y o  female who is originally admitted to the acute surgery service service on 2/16/2018 due to perirectal abscess  We are consulted for hypertension  Patient started with increased pain at home fevers chills called her PCP who was not available was instructed to come to the ED patient was admitted under acute Care surgery Services for evaluation of a perirectal abscess  Patient was recommended to go to the OR which was completed today  Internal Medicine was consulted for hypertension  Review of patient's chart and vital sign show the patient's blood pressure is stable slightly on the softer side likely secondary to OR intervention today    Patient is asymptomatic and can restart her blood pressure medication tomorrow  Slim signing off  Review of Systems:    Review of Systems   Constitutional: Positive for chills and fever  Patient reports the symptoms have resolved since admission and intervention   HENT: Negative  Respiratory: Negative  Cardiovascular: Negative  Gastrointestinal: Positive for anal bleeding  This pain has greatly improved since OR intervention today   Genitourinary: Negative  Musculoskeletal: Negative  Neurological: Negative for dizziness, weakness and headaches  Psychiatric/Behavioral: Negative  Past Medical and Surgical History:     Past Medical History:   Diagnosis Date    Hypertension        History reviewed  No pertinent surgical history  Meds/Allergies:    all medications and allergies reviewed    Allergies: Allergies   Allergen Reactions    Influenza Vaccines     Penicillins Rash       Social History:     Marital Status:     Substance Use History:   History   Alcohol Use No     History   Smoking Status    Current Every Day Smoker    Packs/day: 1 00    Types: Cigarettes   Smokeless Tobacco    Never Used     History   Drug Use No       Family History:    non-contributory    Physical Exam:     Vitals:   Blood Pressure: 101/65 (02/17/18 1500)  Pulse: 79 (02/17/18 1500)  Temperature: 98 6 °F (37 °C) (02/17/18 1500)  Temp Source: Oral (02/17/18 1500)  Respirations: 18 (02/17/18 1500)  Height: 5' 3" (160 cm) (02/16/18 1537)  Weight - Scale: 54 4 kg (120 lb) (02/16/18 1537)  SpO2: 96 % (02/17/18 1500)    Physical Exam   Constitutional: She is oriented to person, place, and time  HENT:   Head: Normocephalic and atraumatic  Eyes: Conjunctivae and EOM are normal    Neck: Normal range of motion  Cardiovascular: Normal rate, regular rhythm and normal heart sounds  Pulmonary/Chest: Effort normal and breath sounds normal    Abdominal: Soft   Bowel sounds are normal    Neurological: She is alert and oriented to person, place, and time    Skin: Skin is warm and dry  Psychiatric: She has a normal mood and affect  Her behavior is normal            Additional Data:     Lab Results: I have personally reviewed pertinent reports  Results from last 7 days  Lab Units 02/17/18  0445   WBC Thousand/uL 22 07*   HEMOGLOBIN g/dL 11 6   HEMATOCRIT % 36 5   PLATELETS Thousands/uL 182   NEUTROS PCT % 81*   LYMPHS PCT % 10*   MONOS PCT % 8   EOS PCT % 0       Results from last 7 days  Lab Units 02/17/18  0445   SODIUM mmol/L 140   POTASSIUM mmol/L 4 0   CHLORIDE mmol/L 107   CO2 mmol/L 25   BUN mg/dL 11   CREATININE mg/dL 1 02   CALCIUM mg/dL 8 8   TOTAL PROTEIN g/dL 6 2*   BILIRUBIN TOTAL mg/dL 0 60   ALK PHOS U/L 63   ALT U/L 12   AST U/L 10   GLUCOSE RANDOM mg/dL 125       Results from last 7 days  Lab Units 02/16/18  1653   INR  0 99       Results from last 7 days  Lab Units 02/16/18  1653   TROPONIN I ng/mL <0 02     No results found for: HGBA1C    Imaging: I have personally reviewed pertinent reports  CT pelvis w contrast   Final Result by Ifeanyi Maldonado MD (02/16 1834)      1   2 9 x 1 1 cm left perianal abscess  Soft tissue stranding and gas extending inferiorly from the perianal abscess into the left perineal region raises concern for developing Hipolito gangrene or developing fistula formation  2   Mild circumferential bladder wall thickening may be reactive  Correlate with UA for cystitis  I personally discussed this study with ELISABETH Robin on 2/16/2018 at 6:31 PM                Workstation performed: [id number]             EKG, Pathology, and Other Studies Reviewed on Admission:   · EKG:      ** Please Note: This note has been constructed using a voice recognition system   **

## 2018-02-18 LAB
ATRIAL RATE: 87 BPM
P AXIS: 74 DEGREES
PR INTERVAL: 148 MS
QRS AXIS: 24 DEGREES
QRSD INTERVAL: 72 MS
QT INTERVAL: 344 MS
QTC INTERVAL: 413 MS
T WAVE AXIS: 75 DEGREES
VENTRICULAR RATE: 87 BPM

## 2018-02-18 PROCEDURE — 93010 ELECTROCARDIOGRAM REPORT: CPT | Performed by: INTERNAL MEDICINE

## 2018-02-18 PROCEDURE — 99024 POSTOP FOLLOW-UP VISIT: CPT | Performed by: SURGERY

## 2018-02-18 RX ADMIN — CIPROFLOXACIN 400 MG: 2 INJECTION, SOLUTION INTRAVENOUS at 18:56

## 2018-02-18 RX ADMIN — METRONIDAZOLE 500 MG: 500 INJECTION, SOLUTION INTRAVENOUS at 02:16

## 2018-02-18 RX ADMIN — CIPROFLOXACIN 400 MG: 2 INJECTION, SOLUTION INTRAVENOUS at 06:41

## 2018-02-18 RX ADMIN — METRONIDAZOLE 500 MG: 500 INJECTION, SOLUTION INTRAVENOUS at 10:56

## 2018-02-18 RX ADMIN — NICOTINE 1 PATCH: 7 PATCH TRANSDERMAL at 11:00

## 2018-02-18 RX ADMIN — FAMOTIDINE 10 MG: 10 INJECTION, SOLUTION INTRAVENOUS at 18:59

## 2018-02-18 RX ADMIN — METRONIDAZOLE 500 MG: 500 INJECTION, SOLUTION INTRAVENOUS at 20:17

## 2018-02-18 RX ADMIN — OXYCODONE HYDROCHLORIDE AND ACETAMINOPHEN 1 TABLET: 5; 325 TABLET ORAL at 19:03

## 2018-02-18 RX ADMIN — FAMOTIDINE 10 MG: 10 INJECTION, SOLUTION INTRAVENOUS at 10:57

## 2018-02-18 RX ADMIN — ACETAMINOPHEN 650 MG: 325 TABLET, FILM COATED ORAL at 02:15

## 2018-02-18 RX ADMIN — OXYCODONE HYDROCHLORIDE AND ACETAMINOPHEN 1 TABLET: 5; 325 TABLET ORAL at 11:05

## 2018-02-18 RX ADMIN — DOCUSATE SODIUM 100 MG: 100 CAPSULE, LIQUID FILLED ORAL at 10:57

## 2018-02-18 RX ADMIN — HEPARIN SODIUM 5000 UNITS: 5000 INJECTION, SOLUTION INTRAVENOUS; SUBCUTANEOUS at 16:07

## 2018-02-18 RX ADMIN — HEPARIN SODIUM 5000 UNITS: 5000 INJECTION, SOLUTION INTRAVENOUS; SUBCUTANEOUS at 06:41

## 2018-02-18 RX ADMIN — DOCUSATE SODIUM 100 MG: 100 CAPSULE, LIQUID FILLED ORAL at 19:00

## 2018-02-18 RX ADMIN — HEPARIN SODIUM 5000 UNITS: 5000 INJECTION, SOLUTION INTRAVENOUS; SUBCUTANEOUS at 22:44

## 2018-02-18 NOTE — PROGRESS NOTES
Progress Note - General Surgery   Anai De La Rosa 81 y o  female MRN: [id number]  Unit/Bed#: -01 Encounter: [id number]    Assessment:  Status post incision and drainage of perirectal abscess, less pain  Elevated white cell count to 22,000, will repeat CBC in the morning    Plan:  Remove packing tomorrow morning  Subjective/Objective   Chief Complaint:  Denies any fever or chills    Subjective:  Denies chest pain or shortness of breath    Objective:     Blood pressure 125/58, pulse 68, temperature 99 °F (37 2 °C), temperature source Oral, resp  rate 18, height 5' 3" (1 6 m), weight 54 4 kg (120 lb), SpO2 93 %  ,Body mass index is 21 26 kg/m²  Intake/Output Summary (Last 24 hours) at 02/18/18 1407  Last data filed at 02/18/18 0930   Gross per 24 hour   Intake             1260 ml   Output              750 ml   Net              510 ml       Invasive Devices     Peripheral Intravenous Line            Peripheral IV 02/17/18 Left Wrist 1 day                Physical Exam:  Abdomen is benign  Dressings have a small amount of serosanguineous drainage  Cultures pending      Lab, Imaging and other studies:CBC: No results found for: WBC, HGB, HCT, MCV, PLT, ADJUSTEDWBC, MCH, MCHC, RDW, MPV, NRBC, CMP: No results found for: NA, K, CL, CO2, ANIONGAP, BUN, CREATININE, GLUCOSE, CALCIUM, AST, ALT, ALKPHOS, PROT, ALBUMIN, BILITOT, EGFR  VTE Pharmacologic Prophylaxis: Sequential compression device (Venodyne)   VTE Mechanical Prophylaxis: sequential compression device

## 2018-02-18 NOTE — CASE MANAGEMENT
Initial Clinical Review    Admission: Date/Time/Statement: 2/16/18 @ 1958     Orders Placed This Encounter   Procedures    Inpatient Admission     Standing Status:   Standing     Number of Occurrences:   1     Order Specific Question:   Admitting Physician     Answer:   Rakel Melton     Order Specific Question:   Level of Care     Answer:   Med Surg [16]     Order Specific Question:   Estimated length of stay     Answer:   More than 2 Midnights     Order Specific Question:   Certification     Answer:   I certify that inpatient services are medically necessary for this patient for a duration of greater than two midnights  See H&P and MD Progress Notes for additional information about the patient's course of treatment  ED: Date/Time/Mode of Arrival:   ED Arrival Information     Expected Arrival Acuity Means of Arrival Escorted By Service Admission Type    - 2/16/2018 15:28 Emergent [facility]In [facility] Surgery-General Urgent    Arrival Complaint    abscess          Chief Complaint:   Chief Complaint   Patient presents with    Perineal Abscess     Pt states " This boil on my vagina is getting big"  pt states the "Boil" has been there for a week  History of Illness:  Marcellus Mccann is a 81 y o  female who presents with perirectal lump, pain, redness 1 week  The pain got progressively worse in the last couple days  She came into the emergency room because she was having shaking chills  Denies having any fever    No problems going to the bathroom        ED Vital Signs:   ED Triage Vitals [02/16/18 1537]   Temperature Pulse Respirations Blood Pressure SpO2   99 9 °F (37 7 °C) 95 19 (!) 183/74 98 %      Temp Source Heart Rate Source Patient Position - Orthostatic VS BP Location FiO2 (%)   Oral Monitor Sitting Left arm --      Pain Score       7        Wt Readings from Last 1 Encounters:   02/16/18 54 4 kg (120 lb)       Vital Signs (abnormal): wnl    Abnormal Labs/Diagnostic Test Results: wbc 16 80  CT pelvis- 1   2 9 x 1 1 cm left perianal abscess  Soft tissue stranding and gas extending inferiorly from the perianal abscess into the left perineal region raises concern for developing Hipolito gangrene or developing fistula formation  2   Mild circumferential bladder wall thickening may be reactive   Correlate with UA for cystitis  ED Treatment:   Medication Administration from 02/16/2018 1528 to 02/16/2018 2059       Date/Time Order Dose Route Action Action by Comments     02/16/2018 1702 sodium chloride 0 9 % bolus 1,000 mL 1,000 mL Intravenous [address] Conchita Gaffney RN      02/16/2018 1702 ondansetron (ZOFRAN) injection 4 mg 4 mg Intravenous Given Conchita Gaffney RN      02/16/2018 2046 vancomycin (VANCOCIN) IVPB (premix) 750 mg 0 mg/kg Intravenous Stopped Felipe Alexander RN      02/16/2018 1850 vancomycin (VANCOCIN) IVPB (premix) 750 mg 750 mg Intravenous [address] [place] [address] Felipe Alexander RN      02/16/2018 2028 levofloxacin (LEVAQUIN) IVPB (premix) 750 mg 750 mg Intravenous [address] [place] [address] Felipe Alexander RN      02/16/2018 1849 metroNIDAZOLE (FLAGYL) IVPB (premix) 500 mg 0 mg Intravenous Stopped Felipe Alexander RN      02/16/2018 1715 metroNIDAZOLE (FLAGYL) IVPB (premix) 500 mg 500 mg Intravenous [address] Conchita Gaffney RN      02/16/2018 1749 iohexol (OMNIPAQUE) 350 MG/ML injection (MULTI-DOSE) 100 mL 100 mL Intravenous Given Gabino Santiago      02/16/2018 2025 morphine injection 2 mg 2 mg Intravenous Not Given Felipe Alexander RN      02/16/2018 1913 nicotine (NICODERM CQ) 14 mg/24hr TD 24 hr patch 14 mg 14 mg Transdermal Medication Applied Felipe Alexander RN           Past Medical/Surgical History:    Active Ambulatory Problems     Diagnosis Date Noted    Perianal abscess 02/16/2018     Resolved Ambulatory Problems     Diagnosis Date Noted    No Resolved Ambulatory Problems     Past Medical History:   Diagnosis Date    Hypertension        Admitting Diagnosis: Perianal abscess [K61 0]  Perirectal abscess [K61 1]  Essential hypertension [I10]  Perineal abscess [L02 215]  Sepsis ([facility] [address] ) [A41 9]    Age/Sex: 81 y o  female    Assessment/Plan: Assessment:  Perirectal abscess for 1 week  Plan:  Advised the patient to undergo incision and drainage of perirectal abscess           Admission Orders:  Scheduled Meds:   Current Facility-Administered Medications:  acetaminophen 650 mg Oral Q6H PRN Cl Humphries PA-C    ciprofloxacin 400 mg Intravenous Q12H Jenifer Corona MD Last Rate: 400 mg (02/18/18 0641)   docusate sodium 100 mg Oral BID Jenifer Corona MD    famotidine 10 mg Intravenous BID Jenifer Corona MD    heparin (porcine) 5,000 Units Subcutaneous [facility] Jenifer Corona MD    metroNIDAZOLE 500 mg Intravenous Q8H Jenifer Corona MD Last Rate: 500 mg (02/18/18 1056)   morphine injection 2 mg Intravenous Once Braeden Austin DO    morphine injection 2 mg Intravenous Q1H PRN Jenifer Corona MD    nicotine 1 patch Transdermal Daily Jenifer Corona MD    ondansetron 4 mg Intravenous Q6H PRN Jenifer Corona MD    oxyCODONE-acetaminophen 1 tablet Oral Q6H PRN Jenifer Corona MD      Continuous Infusions:    PRN Meds:   acetaminophen    morphine injection    ondansetron    oxyCODONE-acetaminophen     Reg diet   Up w/ assist   Inc spirom   I&O   Consult IM   SCD     OP note   2/17  INCISION AND DRAINAGE (I&D) PERIRECTAL (N/A)     IM consult  2/17  Consults      * Perirectal abscess   Assessment & Plan     · Present on admission  · Care per primary team  · Patient to OR today for I and D  · Continue vancomycin  · Continue pain management per primary team  · Continue regular diet  · Monitor patient for fever           Essential hypertension   Assessment & Plan     · Blood pressure soft today likely status post anesthesia from OR otherwise has been acceptable  · Can resume Norvasc in a m    · Blood pressure is otherwise stable  · Slim will sign off call with questions                VTE Prophylaxis: Reason for no pharmacologic prophylaxis Due to surgical intervention  / sequential compression device      Recommendations for Discharge:  · Continue Norvasc upon discharge

## 2018-02-19 VITALS
BODY MASS INDEX: 21.26 KG/M2 | HEART RATE: 69 BPM | DIASTOLIC BLOOD PRESSURE: 62 MMHG | HEIGHT: 63 IN | OXYGEN SATURATION: 93 % | WEIGHT: 120 LBS | SYSTOLIC BLOOD PRESSURE: 138 MMHG | RESPIRATION RATE: 18 BRPM | TEMPERATURE: 99.7 F

## 2018-02-19 LAB
BASOPHILS # BLD AUTO: 0.03 THOUSANDS/ΜL (ref 0–0.1)
BASOPHILS NFR BLD AUTO: 0 % (ref 0–1)
EOSINOPHIL # BLD AUTO: 0.03 THOUSAND/ΜL (ref 0–0.61)
EOSINOPHIL NFR BLD AUTO: 0 % (ref 0–6)
ERYTHROCYTE [DISTWIDTH] IN BLOOD BY AUTOMATED COUNT: 12.5 % (ref 11.6–15.1)
HCT VFR BLD AUTO: 33.7 % (ref 34.8–46.1)
HGB BLD-MCNC: 10.8 G/DL (ref 11.5–15.4)
LYMPHOCYTES # BLD AUTO: 1.36 THOUSANDS/ΜL (ref 0.6–4.47)
LYMPHOCYTES NFR BLD AUTO: 10 % (ref 14–44)
MCH RBC QN AUTO: 30.2 PG (ref 26.8–34.3)
MCHC RBC AUTO-ENTMCNC: 32 G/DL (ref 31.4–37.4)
MCV RBC AUTO: 94 FL (ref 82–98)
MONOCYTES # BLD AUTO: 1.12 THOUSAND/ΜL (ref 0.17–1.22)
MONOCYTES NFR BLD AUTO: 8 % (ref 4–12)
NEUTROPHILS # BLD AUTO: 11.03 THOUSANDS/ΜL (ref 1.85–7.62)
NEUTS SEG NFR BLD AUTO: 81 % (ref 43–75)
NRBC BLD AUTO-RTO: 0 /100 WBCS
PLATELET # BLD AUTO: 169 THOUSANDS/UL (ref 149–390)
PMV BLD AUTO: 11.5 FL (ref 8.9–12.7)
RBC # BLD AUTO: 3.58 MILLION/UL (ref 3.81–5.12)
WBC # BLD AUTO: 13.63 THOUSAND/UL (ref 4.31–10.16)

## 2018-02-19 PROCEDURE — 85025 COMPLETE CBC W/AUTO DIFF WBC: CPT | Performed by: SURGERY

## 2018-02-19 PROCEDURE — 99024 POSTOP FOLLOW-UP VISIT: CPT | Performed by: SURGERY

## 2018-02-19 RX ORDER — CIPROFLOXACIN 500 MG/1
500 TABLET, FILM COATED ORAL EVERY 12 HOURS SCHEDULED
Status: DISCONTINUED | OUTPATIENT
Start: 2018-02-19 | End: 2018-02-19 | Stop reason: HOSPADM

## 2018-02-19 RX ORDER — ASPIRIN 81 MG/1
81 TABLET, CHEWABLE ORAL DAILY
Status: DISCONTINUED | OUTPATIENT
Start: 2018-02-20 | End: 2018-02-19 | Stop reason: HOSPADM

## 2018-02-19 RX ORDER — CIPROFLOXACIN 500 MG/1
500 TABLET, FILM COATED ORAL EVERY 12 HOURS SCHEDULED
Qty: 22 TABLET | Refills: 0 | Status: SHIPPED | OUTPATIENT
Start: 2018-02-19 | End: 2018-03-02

## 2018-02-19 RX ORDER — METRONIDAZOLE 500 MG/1
500 TABLET ORAL EVERY 8 HOURS SCHEDULED
Status: DISCONTINUED | OUTPATIENT
Start: 2018-02-19 | End: 2018-02-19 | Stop reason: HOSPADM

## 2018-02-19 RX ORDER — FAMOTIDINE 20 MG/1
10 TABLET, FILM COATED ORAL 2 TIMES DAILY
Status: DISCONTINUED | OUTPATIENT
Start: 2018-02-19 | End: 2018-02-19 | Stop reason: HOSPADM

## 2018-02-19 RX ORDER — OXYCODONE HYDROCHLORIDE AND ACETAMINOPHEN 5; 325 MG/1; MG/1
1 TABLET ORAL EVERY 6 HOURS PRN
Qty: 18 TABLET | Refills: 0 | Status: SHIPPED | OUTPATIENT
Start: 2018-02-19 | End: 2018-02-22

## 2018-02-19 RX ORDER — AMLODIPINE BESYLATE 10 MG/1
10 TABLET ORAL DAILY
Status: DISCONTINUED | OUTPATIENT
Start: 2018-02-20 | End: 2018-02-19 | Stop reason: HOSPADM

## 2018-02-19 RX ADMIN — HEPARIN SODIUM 5000 UNITS: 5000 INJECTION, SOLUTION INTRAVENOUS; SUBCUTANEOUS at 05:12

## 2018-02-19 RX ADMIN — NICOTINE 1 PATCH: 7 PATCH TRANSDERMAL at 08:54

## 2018-02-19 RX ADMIN — METRONIDAZOLE 500 MG: 500 TABLET ORAL at 11:42

## 2018-02-19 RX ADMIN — CIPROFLOXACIN 400 MG: 2 INJECTION, SOLUTION INTRAVENOUS at 05:17

## 2018-02-19 RX ADMIN — DOCUSATE SODIUM 100 MG: 100 CAPSULE, LIQUID FILLED ORAL at 08:53

## 2018-02-19 RX ADMIN — FAMOTIDINE 10 MG: 10 INJECTION, SOLUTION INTRAVENOUS at 08:53

## 2018-02-19 RX ADMIN — METRONIDAZOLE 500 MG: 500 INJECTION, SOLUTION INTRAVENOUS at 02:31

## 2018-02-19 NOTE — PROGRESS NOTES
The ciprofloxacin has / have been converted to Oral per [facility] IV-to-PO Auto-Conversion Protocol for Adults as approved by the Pharmacy and Therapeutics Committee  The patient met all eligible criteria:  3 Age = 25years old   2) Received at least one dose of the IV form   3) Receiving at least one other scheduled oral/enteral medication   4) Tolerating an oral/enteral diet   and did not have any exclusions:   1) Critical care patient   2) Active GI bleed (IF assessing H2RAs or PPIs)   3) Continuous tube feeding (IF assessing cipro, doxycycline, levofloxacin, minocycline, rifampin, or voriconazole)   4) Receiving PO vancomycin (IF assessing metronidazole)   5) Persistent nausea and/or vomiting   6) Ileus or gastrointestinal obstruction   7) Anusha/nasogastric tube set for continuous suction   8) Specific order not to automatically convert to PO (in the order's comments or if discussed in the most recent Infectious Disease or primary team's progress notes)

## 2018-02-19 NOTE — PROGRESS NOTES
The metronidazole has / have been converted to Oral per [facility] IV-to-PO Auto-Conversion Protocol for Adults as approved by the Pharmacy and Therapeutics Committee  The patient met all eligible criteria:  3 Age = 26years old   2) Received at least one dose of the IV form   3) Receiving at least one other scheduled oral/enteral medication   4) Tolerating an oral/enteral diet   and did not have any exclusions:   1) Critical care patient   2) Active GI bleed (IF assessing H2RAs or PPIs)   3) Continuous tube feeding (IF assessing cipro, doxycycline, levofloxacin, minocycline, rifampin, or voriconazole)   4) Receiving PO vancomycin (IF assessing metronidazole)   5) Persistent nausea and/or vomiting   6) Ileus or gastrointestinal obstruction   7) Anusha/nasogastric tube set for continuous suction   8) Specific order not to automatically convert to PO (in the order's comments or if discussed in the most recent Infectious Disease or primary team's progress notes)

## 2018-02-19 NOTE — DISCHARGE INSTRUCTIONS
Wet to dry dressing to perirectal abscess change daily    VNA for dressing changes  Cipro antibiotic for 11 days   Resume home meds  Call the office for an appointment in 2 weeks

## 2018-02-19 NOTE — PROGRESS NOTES
The famotidine has / have been converted to Oral per [facility] IV-[facility]PO Auto-Con[facility] Protocol for Adults as approved by the Pharmacy and Therapeutics Committee  The patient met all eligible criteria:  3 Age = 26years old   2) Received at least one dose of the IV form   3) Receiving at least one other scheduled oral/enteral medication   4) Tolerating an oral/enteral diet   and did not have any exclusions:   1) Critical care patient   2) Active GI bleed (IF assessing H2RAs or PPIs)   3) Continuous tube feeding (IF assessing cipro, doxycycline, levofloxacin, minocycline, rifampin, or voriconazole)   4) Receiving PO vancomycin (IF assessing metronidazole)   5) Persistent nausea and/or vomiting   6) Ileus or gastrointestinal obstruction   7) Anusha/nasogastric tube set for continuous suction   8) Specific order not to automatically convert to PO (in the order's comments or if discussed in the most recent Infectious Disease or primary team's progress notes)

## 2018-02-19 NOTE — POST OP PROGRESS NOTES
Postop day 2, perirectal abscess I and D  with packing  Packing was removed today with the assistance of nursing in shower  Cavity is clean, there are no necrotic edges, there was no slough or exudate in the wound bed  Zuly wound is without erythema or induration      Continue with wet to dry dressings  Continue Cipro Flagyl  WBC 13 and 22 on admission, continue to monitor    Matheus Melgar PA-C

## 2018-02-19 NOTE — DISCHARGE SUMMARY
Discharge Summary - Arcelia Day 81 y o  female MRN: [id number]    Unit/Bed#: MS San-01 Encounter: [id number]    Admission Date: 2/16/2018     Admitting Diagnosis: Perianal abscess [K61 0]  Perirectal abscess [K61 1]  Essential hypertension [I10]  Perineal abscess [L02 215]  Sepsis ([address] ) [A41 9]    HPI:  Patient reported to the emergency room complaining of a boil on her vagina for several weeks, she states that the pain became worse  On emergency room examination she was found to have a perirectal abscess  She was started on IV antibiotics, she was made NPO and she was taken to the operating room for I and D and packing of perirectal abscess  On day of discharge the packing was removed, the wound looked clean  There was no necrotic tissue, purulent drainage, exudate, no zeinab wound erythema or induration  Wet to dry dressing was applied by nursing  Patient's white count on admission was 22,000, today it was 13, she will go home on Cipro x 11  days  She will follow up in the surgery office in 2 weeks, she will follow up with her primary care doctor in 1 week  Case management was consulted to set up VNA  [facility] has been obtained  They will come and do dressing changes    Procedures Performed:  I and D of perirectal abscess in [address] [place] [address] Course:  See HPI    Significant Findings, Care, Treatment and Services Provided:  ROJELIO MARINA consulted for management of blood pressure, blood pressures were maintained, no adjustments were needed with her medications  Complications:  None    Discharge Diagnosis:  Perirectal abscess resolved    Condition at Discharge: good     Discharge instructions/Information to patient and family:   See after visit summary for information provided to patient and family  Provisions for Follow-Up Care:  See after visit summary for information related to follow-up care and any pertinent home health orders        Disposition: See After Visit Summary for discharge disposition information  Planned Readmission: No    Discharge Statement   I spent 30   minutes discharging the patient  This time was spent on the day of discharge  I had direct contact with the patient on the day of discharge  Additional documentation is required if more than 30 minutes were spent on discharge  Discharge Medications:  See after visit summary for reconciled discharge medications provided to patient and family          Matheus Melgar PA-C

## 2018-02-19 NOTE — PROGRESS NOTES
3 cm x 3 cm x 2 cm I & D perirectal abscess   Packing removed as inpatient, rinsed with shower water, wound is clean, no necrotic wound edges, no exudate or purulent drainage  granulation and pink wound bed  Wet to dry dressing applied

## 2018-02-19 NOTE — PHYSICIAN ADVISOR
This patient is a 81 y o  y/o female who is admitted to the hospital for Perineal Abscess (Pt states " This boil on my vagina is getting big"  pt states the "Boil" has been there for a week  )       The patient presented to the ED on 2/16/18 at 1528 and was admitted to the hospital on 2/16/2018 1626  History of Present Illness includes: David Trinidad is a 81 y o  female who presents with perirectal lump, pain, redness 1 week  The pain got progressively worse in the last couple days  She came into the emergency room because she was having shaking chills  Denies having any fever  No problems going to the bathroom  Vital signs in the ER are as follows ED Triage Vitals [02/16/18 1537]   Temperature Pulse Respirations Blood Pressure SpO2   99 9 °F (37 7 °C) 95 19 (!) 183/74 98 %      Temp Source Heart Rate Source Patient Position - Orthostatic VS BP Location FiO2 (%)   Oral Monitor Sitting Left arm --      Pain Score       7             Treatment in the ER included: basic labs, imaging, IVF, pain meds, IV antibiotics  CT pelvis w contrast   Final Result by Lata Dyer MD (02/16 1834)       1   2 9 x 1 1 cm left perianal abscess  Soft tissue stranding and gas extending inferiorly from the perianal abscess into the left perineal region raises concern for developing Hipolito gangrene or developing fistula formation        2   Mild circumferential bladder wall thickening may be reactive  Correlate with UA for cystitis  The patient is admitted as INPATIENT  and has remained hospitalized for 3 day(s)  Last vital signs were Blood pressure 138/62, pulse 69, temperature 99 7 °F (37 6 °C), temperature source Oral, resp  rate 18, height 5' 3" (1 6 m), weight 54 4 kg (120 lb), SpO2 93 %  Treatment includes:  I&D, IV abx, cultures, medicine consult and pain control        Results include:       0  Lab Value Date/Time   TROPONINI <0 02 02/16/2018 1653           Results from last 7 days  Lab Units 02/16/18  1653   LACTIC ACID mmol/L 0 9         Results from last 7 days  Lab Units 02/19/18  0439 02/17/18  0445 02/16/18  2125 02/16/18  1653   WBC Thousand/uL 13 63* 22 07*  --  16 80*   HEMOGLOBIN g/dL 10 8* 11 6  --  13 3   HEMATOCRIT % 33 7* 36 5  --  41 9   PLATELETS Thousands/uL 169 182 178 199         Results from last 7 days  Lab Units 02/17/18  0445 02/16/18  1653   SODIUM mmol/L 140 140   POTASSIUM mmol/L 4 0 3 6   CHLORIDE mmol/L 107 103   CO2 mmol/L 25 26   BUN mg/dL 11 15   CREATININE mg/dL 1 02 1 07   GLUCOSE RANDOM mg/dL 125 118   CALCIUM mg/dL 8 8 9 1       Recent Cultures:      Results from last 7 days  Lab Units 02/17/18  1104 02/16/18  1653   BLOOD CULTURE   --  No Growth at 48 hrs  No Growth at 48 hrs  GRAM STAIN RESULT  1+ Polys  2+ Gram positive cocci in pairs  2+ Gram negative rods  1+ Gram positive rods  --    WOUND CULTURE  No growth  --        The patient is appropriate for  Inpatient Admission  The rationale is as follows: The patient is a 80 y/o female who presented with painful/worsening zeinab-rectal abscess for 1 week  She was noted to have significant leukocytosis on admission and required abscess I&D, IV abx, cultures, medicine consult and pain control  There is documentation by the admitting physician that the patient would require greater than two midnight stay based on medical necessity  Hospitalization is necessary to prevent further deterioration of her clinical condition  After review of the medical chart, labs, imaging, and notes - I agree that the patient meets criteria for INPATIENT ADMISSION

## 2018-02-19 NOTE — PLAN OF CARE
Problem: DISCHARGE PLANNING - CARE MANAGEMENT  Goal: Discharge to post-acute care or home with appropriate resources  INTERVENTIONS:  - Conduct assessment to determine patient/family and health care team treatment goals, and need for post-acute services based on payer coverage, community resources, and patient preferences, and barriers to discharge  - Address psychosocial, clinical, and financial barriers to discharge as identified in assessment in conjunction with the patient/family and health care team  - Arrange appropriate level of post-acute services according to patient's   needs and preference and payer coverage in collaboration with the physician and health care team  - Communicate with and update the patient/family, physician, and health care team regarding progress on the discharge plan  - Arrange appropriate transportation to post-acute venues  Outcome: Completed Date Met: 02/19/18  Patient states she does not have rehab hx and HHC hx   Patient states she fills her prescriptions at [facility] in [address]  Patient denies mental health dx hx and substance abuse hx  Patient states upon discharge her friend Keila Pump will pick her up  Patient is referred to her choice [facility] home services for the wound care  Patient denies additional needs  Patient is cleared for discharge today and have signed the IMM  A copy is given and the original is placed in the chart

## 2018-02-19 NOTE — PROGRESS NOTES
02/19/18 1518   Patient Information   Mental Status Alert   Primary Caregiver Self   Support System Immediate family  (daughters [de-identified] and maritza)   [address] Proxy Appointed Yes - [address] Proxy section   Activities of Daily Living Prior to Admission   Functional Status Independent   Assistive Device No device needed   Living Arrangement House  (ranch with basement two steps to enter)   [address] of Transport to [address]ts: [address] Self     Patient states she does not have rehab hx and HHC hx   Patient states she fills her prescriptions at [facility] in [address]  Patient denies mental health dx hx and substance abuse hx  Patient states upon discharge her friend Joseph Ours will pick her up  Patient is referred to her choice [facility] home [facility] for the wound care  Patient denies additional needs  Patient is cleared for discharge today and have signed the IMM  A copy is given and the original is placed in the chart

## 2018-02-20 ENCOUNTER — TELEPHONE (OUTPATIENT)
Dept: FAMILY MEDICINE CLINIC | Facility: MEDICAL CENTER | Age: 83
End: 2018-02-20

## 2018-02-20 LAB
BACTERIA WND AEROBE CULT: NORMAL
GRAM STN SPEC: NORMAL

## 2018-02-20 NOTE — TELEPHONE ENCOUNTER
Dina is the visiting nurse service going into see Yoselin Hines  She was in the hospital for an anal abscess  They are going in daily to pack the wound with a wet to dry dressing since her discharge per orders from her surgeon, Dr Emmanuel Denis  She is currently still taking Cipro BID  She is doing fine otherwise  This was just to update you on her progress per the VN

## 2018-02-21 ENCOUNTER — TRANSITIONAL CARE MANAGEMENT (OUTPATIENT)
Dept: FAMILY MEDICINE CLINIC | Facility: MEDICAL CENTER | Age: 83
End: 2018-02-21

## 2018-02-21 LAB
BACTERIA BLD CULT: NORMAL
BACTERIA BLD CULT: NORMAL

## 2018-02-23 LAB
BACTERIA SPEC ANAEROBE CULT: ABNORMAL

## 2018-03-05 ENCOUNTER — OFFICE VISIT (OUTPATIENT)
Dept: SURGERY | Facility: CLINIC | Age: 83
End: 2018-03-05

## 2018-03-05 VITALS
RESPIRATION RATE: 12 BRPM | HEIGHT: 63 IN | SYSTOLIC BLOOD PRESSURE: 126 MMHG | WEIGHT: 129.9 LBS | TEMPERATURE: 98 F | DIASTOLIC BLOOD PRESSURE: 62 MMHG | HEART RATE: 68 BPM | BODY MASS INDEX: 23.02 KG/M2

## 2018-03-05 DIAGNOSIS — K61.1 PERIRECTAL ABSCESS: Primary | ICD-10-CM

## 2018-03-05 PROBLEM — K61.0 PERIANAL ABSCESS: Status: RESOLVED | Noted: 2018-02-16 | Resolved: 2018-03-05

## 2018-03-05 PROCEDURE — 99024 POSTOP FOLLOW-UP VISIT: CPT | Performed by: SURGERY

## 2018-03-05 NOTE — PROGRESS NOTES
Post Operative Note    Subjective:  Patient is s/p incision and drainage perirectal abscess  Pain is well controlled, requiring no further narcotics  Tolerating diet, normal bowel function  No incisional complaints  Wound healing well  No further packing able to be done  Patient is cleaning the area well  Exam:  AAO, NAD  Perirectal exam left sided incision no surrounding erythema induration or expressible drainage  Wound is very shallow healing well good granulation tissue in the base no surrounding abnormality  Assessment/Plan:  Patient is status post incision and drainage of perirectal abscess  Patient is recovering well  Patient has no further surgical issues at this time  May follow-up in the future for any future concerns

## 2018-03-16 ENCOUNTER — TELEPHONE (OUTPATIENT)
Dept: FAMILY MEDICINE CLINIC | Facility: MEDICAL CENTER | Age: 83
End: 2018-03-16

## 2018-03-16 NOTE — TELEPHONE ENCOUNTER
Mrs Denis Garcia would like to speak with you about her daughter Yasmin Claudio  She is having personal problems with her daughter about a relationship she is having  Her nerves are getting the best of her  She would like you to refer her to someone that can help her cope

## 2018-03-19 ENCOUNTER — TELEPHONE (OUTPATIENT)
Dept: FAMILY MEDICINE CLINIC | Facility: MEDICAL CENTER | Age: 83
End: 2018-03-19

## 2018-03-19 NOTE — TELEPHONE ENCOUNTER
juan mcmillan called, she thinks her mother might be starting with some dementia  Apparently per Amanda Mariam lives in an apartment in the home and the home is in [place] name also  Apparently there are some issues w/Betrenes boyfriend moving into the home  So now Zaida Kathleen is telling people that Luciano Clinton is suicidal   Luciano Oz assured me that she is not suicidal, and just wants you to be aware

## 2018-03-29 ENCOUNTER — OFFICE VISIT (OUTPATIENT)
Dept: FAMILY MEDICINE CLINIC | Facility: MEDICAL CENTER | Age: 83
End: 2018-03-29
Payer: MEDICARE

## 2018-03-29 VITALS
SYSTOLIC BLOOD PRESSURE: 112 MMHG | DIASTOLIC BLOOD PRESSURE: 60 MMHG | TEMPERATURE: 98.1 F | WEIGHT: 127.38 LBS | HEIGHT: 63 IN | BODY MASS INDEX: 22.57 KG/M2 | RESPIRATION RATE: 16 BRPM | HEART RATE: 90 BPM

## 2018-03-29 DIAGNOSIS — J01.00 ACUTE NON-RECURRENT MAXILLARY SINUSITIS: ICD-10-CM

## 2018-03-29 DIAGNOSIS — M17.0 PRIMARY OSTEOARTHRITIS OF BOTH KNEES: Primary | ICD-10-CM

## 2018-03-29 PROCEDURE — 99213 OFFICE O/P EST LOW 20 MIN: CPT | Performed by: FAMILY MEDICINE

## 2018-03-29 RX ORDER — MELOXICAM 7.5 MG/1
7.5 TABLET ORAL DAILY
Qty: 30 TABLET | Refills: 1 | Status: SHIPPED | OUTPATIENT
Start: 2018-03-29 | End: 2019-04-29 | Stop reason: ALTCHOICE

## 2018-03-29 RX ORDER — DOXYCYCLINE 100 MG/1
100 TABLET ORAL 2 TIMES DAILY
Qty: 20 TABLET | Refills: 0 | Status: SHIPPED | OUTPATIENT
Start: 2018-03-29 | End: 2018-04-08

## 2018-03-29 RX ORDER — GUARN/MA-HUANG/P.GIN/S.GINSENG
TABLET ORAL
COMMUNITY

## 2018-03-29 RX ORDER — CHOLECALCIFEROL (VITAMIN D3) 50 MCG
CAPSULE ORAL
COMMUNITY
End: 2019-04-29 | Stop reason: ALTCHOICE

## 2018-03-29 NOTE — PATIENT INSTRUCTIONS
Cold Symptoms, Ambulatory Care   GENERAL INFORMATION:   Cold symptoms  include sneezing, dry throat, a stuffy nose, headache, watery eyes, and a cough  Your cough may be dry, or you may cough up mucus  You may also have muscle aches, joint pain, and tiredness  Rarely, you may have a fever  Cold symptoms occur from inflammation in your upper respiratory system caused by a virus  Most colds go away without treatment  Seek immediate care for the following symptoms:   · A heartbeat that is much faster than usual for you     · A swollen neck that is sore to the touch     · Increased tiredness and weakness    · Pinpoint or larger reddish-purple dots on your skin     · Poor or no appetite  Treatment for cold symptoms  may include NSAIDS to decrease muscle aches and fever  Do not give NSAID medicines to children under 10months of age without direction from your child's doctor  Cold medicines may also be given to decrease coughing, nasal stuffiness, sneezing, and a runny nose  Do not give cold medicines to children under 11years of age without direction from your child's doctor  Manage your cold symptoms with the following:   · Drink liquids  to help thin and loosen thick mucus so you can cough it up  Liquids will also keep you hydrated  Ask your healthcare provider which liquids are best for you and how much to drink each day  · Do not smoke  because it may worsen your symptoms and increase the length of time you feel sick  Talk with your healthcare provider if you need help to stop smoking  Prevent the spread of germs  by washing your hands often  You can spread your cold germs to others for at least 3 days after your symptoms start  Do not share items, such as eating utensils  Cover your nose and mouth when you cough or sneeze using the crook of your elbow instead of your hands  Throw used tissues in the garbage    Follow up with your healthcare provider as directed:  Write down your questions so you remember to ask them during your visits  CARE AGREEMENT:   You have the right to help plan your care  Learn about your health condition and how it may be treated  Discuss treatment options with your caregivers to decide what care you want to receive  You always have the right to refuse treatment  The above information is an  only  It is not intended as medical advice for individual conditions or treatments  Talk to your doctor, nurse or pharmacist before following any medical regimen to see if it is safe and effective for you  © 2014 [address] is for End User's use only and may not be sold, redistributed or otherwise used for commercial purposes  All illustrations and images included in CareNotes® are the copyrighted property of A D A M , Inc  or Neymar Landon

## 2018-03-29 NOTE — PROGRESS NOTES
Patient complains of feeling sick for about a week  She initially ran some low-grade fevers  She also had fatigue  She now has a productive cough  She has no fevers or chills at this time  She also complains of ongoing bilateral knee pain  /60   Pulse 90   Temp 98 1 °F (36 7 °C) (Oral)   Resp 16   Ht 5' 3" (1 6 m)   Wt 57 8 kg (127 lb 6 oz)   BMI 22 56 kg/m²     ENT normal except for red nasal turbinates some postnasal drip  Slightly productive cough occasional rhonchus on auscultation    She has chronic changes of both knees consistent with osteoarthritis    Will treat with doxycycline for her sinusitis/bronchitis  Also will try a low dose of meloxicam for her arthritis of the knees

## 2018-05-09 ENCOUNTER — OFFICE VISIT (OUTPATIENT)
Dept: FAMILY MEDICINE CLINIC | Facility: MEDICAL CENTER | Age: 83
End: 2018-05-09
Payer: MEDICARE

## 2018-05-09 VITALS
WEIGHT: 129.13 LBS | HEIGHT: 63 IN | RESPIRATION RATE: 16 BRPM | SYSTOLIC BLOOD PRESSURE: 156 MMHG | HEART RATE: 72 BPM | DIASTOLIC BLOOD PRESSURE: 60 MMHG | BODY MASS INDEX: 22.88 KG/M2

## 2018-05-09 DIAGNOSIS — M85.80 OSTEOPENIA, UNSPECIFIED LOCATION: ICD-10-CM

## 2018-05-09 DIAGNOSIS — I10 ESSENTIAL HYPERTENSION: Primary | Chronic | ICD-10-CM

## 2018-05-09 DIAGNOSIS — E78.00 PURE HYPERCHOLESTEROLEMIA: ICD-10-CM

## 2018-05-09 PROCEDURE — 99214 OFFICE O/P EST MOD 30 MIN: CPT | Performed by: FAMILY MEDICINE

## 2018-05-09 NOTE — PROGRESS NOTES
That is doing well overall  She has a male  whom she is known for years  The relationship is going very well and she is very happy  She is driving  No getting lost or  fender Benders  Doing her checkbook and finances  Denies any issues with her memory  She was hospitalized this winter for a perianal abscess  No further symptoms  Rakel Esha says she was pressure washing her house 5 days ago  Pain is achey  No problem with BM's  No urinary symptoms  Pain is aching but not that severe  Feeling welll  No other complaints  O:/60   Pulse 72   Resp 16   Ht 5' 3" (1 6 m)   Wt 58 6 kg (129 lb 2 oz)   BMI 22 87 kg/m²   BP by me 140/80  Physical Exam   Constitutional: She appears well-developed and well-nourished  No distress  Neck: Carotid bruit is not present  No thyromegaly present  Cardiovascular: Normal rate, regular rhythm, normal heart sounds and intact distal pulses  Pulmonary/Chest: Effort normal and breath sounds normal    Abdominal: Soft  Bowel sounds are normal  She exhibits no mass  There is no hepatomegaly  There is no tenderness  Musculoskeletal: She exhibits no edema  Lymphadenopathy:     She has no cervical adenopathy  Assessment  1  Hypertension-controlled  2  Right abdominal wall pain-strain  Advised rest Tylenol as needed  Recheck if no better  3  Status post perianal abscess status resolved    Plan  Due for blood work  Recheck 6 months

## 2018-05-12 DIAGNOSIS — I10 ESSENTIAL HYPERTENSION: Primary | ICD-10-CM

## 2018-05-13 RX ORDER — AMLODIPINE BESYLATE 10 MG/1
TABLET ORAL
Qty: 90 TABLET | Refills: 1 | Status: SHIPPED | OUTPATIENT
Start: 2018-05-13 | End: 2018-11-18 | Stop reason: SDUPTHER

## 2018-05-23 ENCOUNTER — APPOINTMENT (OUTPATIENT)
Dept: LAB | Facility: MEDICAL CENTER | Age: 83
End: 2018-05-23
Payer: MEDICARE

## 2018-05-23 DIAGNOSIS — E78.00 PURE HYPERCHOLESTEROLEMIA: ICD-10-CM

## 2018-05-23 DIAGNOSIS — M85.80 OSTEOPENIA, UNSPECIFIED LOCATION: ICD-10-CM

## 2018-05-23 DIAGNOSIS — I10 ESSENTIAL HYPERTENSION: Chronic | ICD-10-CM

## 2018-05-23 LAB
ALBUMIN SERPL BCP-MCNC: 3.6 G/DL (ref 3.5–5)
ALP SERPL-CCNC: 65 U/L (ref 46–116)
ALT SERPL W P-5'-P-CCNC: 19 U/L (ref 12–78)
ANION GAP SERPL CALCULATED.3IONS-SCNC: 4 MMOL/L (ref 4–13)
AST SERPL W P-5'-P-CCNC: 13 U/L (ref 5–45)
BILIRUB SERPL-MCNC: 0.47 MG/DL (ref 0.2–1)
BUN SERPL-MCNC: 17 MG/DL (ref 5–25)
CALCIUM SERPL-MCNC: 9.3 MG/DL (ref 8.3–10.1)
CHLORIDE SERPL-SCNC: 109 MMOL/L (ref 100–108)
CHOLEST SERPL-MCNC: 276 MG/DL (ref 50–200)
CO2 SERPL-SCNC: 29 MMOL/L (ref 21–32)
CREAT SERPL-MCNC: 1.01 MG/DL (ref 0.6–1.3)
ERYTHROCYTE [DISTWIDTH] IN BLOOD BY AUTOMATED COUNT: 13.4 % (ref 11.6–15.1)
EST. AVERAGE GLUCOSE BLD GHB EST-MCNC: 120 MG/DL
GFR SERPL CREATININE-BSD FRML MDRD: 52 ML/MIN/1.73SQ M
GLUCOSE P FAST SERPL-MCNC: 90 MG/DL (ref 65–99)
HBA1C MFR BLD: 5.8 % (ref 4.2–6.3)
HCT VFR BLD AUTO: 41.6 % (ref 34.8–46.1)
HDLC SERPL-MCNC: 75 MG/DL (ref 40–60)
HGB BLD-MCNC: 12.8 G/DL (ref 11.5–15.4)
LDLC SERPL CALC-MCNC: 181 MG/DL (ref 0–100)
MCH RBC QN AUTO: 29.2 PG (ref 26.8–34.3)
MCHC RBC AUTO-ENTMCNC: 30.8 G/DL (ref 31.4–37.4)
MCV RBC AUTO: 95 FL (ref 82–98)
NONHDLC SERPL-MCNC: 201 MG/DL
PLATELET # BLD AUTO: 246 THOUSANDS/UL (ref 149–390)
PMV BLD AUTO: 11.7 FL (ref 8.9–12.7)
POTASSIUM SERPL-SCNC: 4.3 MMOL/L (ref 3.5–5.3)
PROT SERPL-MCNC: 7.5 G/DL (ref 6.4–8.2)
RBC # BLD AUTO: 4.38 MILLION/UL (ref 3.81–5.12)
SODIUM SERPL-SCNC: 142 MMOL/L (ref 136–145)
TRIGL SERPL-MCNC: 100 MG/DL
TSH SERPL DL<=0.05 MIU/L-ACNC: 2.83 UIU/ML (ref 0.36–3.74)
WBC # BLD AUTO: 5.61 THOUSAND/UL (ref 4.31–10.16)

## 2018-05-23 PROCEDURE — 83036 HEMOGLOBIN GLYCOSYLATED A1C: CPT

## 2018-05-23 PROCEDURE — 85027 COMPLETE CBC AUTOMATED: CPT

## 2018-05-23 PROCEDURE — 84443 ASSAY THYROID STIM HORMONE: CPT

## 2018-05-23 PROCEDURE — 36415 COLL VENOUS BLD VENIPUNCTURE: CPT

## 2018-05-23 PROCEDURE — 80053 COMPREHEN METABOLIC PANEL: CPT

## 2018-05-23 PROCEDURE — 80061 LIPID PANEL: CPT

## 2018-11-12 ENCOUNTER — OFFICE VISIT (OUTPATIENT)
Dept: FAMILY MEDICINE CLINIC | Facility: MEDICAL CENTER | Age: 83
End: 2018-11-12
Payer: MEDICARE

## 2018-11-12 VITALS
BODY MASS INDEX: 24.94 KG/M2 | WEIGHT: 140.8 LBS | RESPIRATION RATE: 16 BRPM | HEART RATE: 76 BPM | SYSTOLIC BLOOD PRESSURE: 160 MMHG | DIASTOLIC BLOOD PRESSURE: 70 MMHG

## 2018-11-12 DIAGNOSIS — E78.00 PURE HYPERCHOLESTEROLEMIA: ICD-10-CM

## 2018-11-12 DIAGNOSIS — I65.23 BILATERAL CAROTID ARTERY STENOSIS: ICD-10-CM

## 2018-11-12 DIAGNOSIS — I10 ESSENTIAL HYPERTENSION: Primary | ICD-10-CM

## 2018-11-12 DIAGNOSIS — Z86.73 HISTORY OF STROKE: ICD-10-CM

## 2018-11-12 DIAGNOSIS — R09.89 BILATERAL CAROTID BRUITS: ICD-10-CM

## 2018-11-12 PROCEDURE — 99214 OFFICE O/P EST MOD 30 MIN: CPT | Performed by: FAMILY MEDICINE

## 2018-11-12 NOTE — PROGRESS NOTES
Greg Villar is doing well overall  She  complains of gas  Has lots of flatus  She avoids gas producing foods  She eats fruits and vegetables  She says she was having a problem with constipation and started Activa yogurt and its helping  Otherwise she is feeling well  She has a gentleman friend who she continues to see  Raking leaves ; no shortness of breath or chest pain   She continues to smoke cigarettes  Declines cessation  O: /70 (Cuff Size: Standard)   Pulse 76   Resp 16   Wt 63 9 kg (140 lb 12 8 oz)   BMI 24 94 kg/m²   BP by me 120/78  Neck no adenopathy  Left carotid bruit  Chest diffusely decreased breath sounds but clear  Cardiac regular rate without murmur  Extremities no edema distal pulses full    Assessment  1  Hypertension-controlled with amlodipine  2  History of CVA-due for carotid ultrasound  3  Dyslipidemia-unable to tolerate statins  She is taking fish oil although may be causing her flatus I doubt it  4  Excessive flatus-review diet and medications  She is going to stop the fish oil and then the calcium supplement to see if any affect  5  Health maintenance-declines immunizations  CT lung cancer screening and mammography previously discussed  6  Cigarette smoker-declines cessation  ,     Plan  As above  Check carotid US  Recheck 6 months

## 2018-11-18 DIAGNOSIS — I10 ESSENTIAL HYPERTENSION: ICD-10-CM

## 2018-11-19 RX ORDER — AMLODIPINE BESYLATE 10 MG/1
TABLET ORAL
Qty: 90 TABLET | Refills: 1 | Status: SHIPPED | OUTPATIENT
Start: 2018-11-19 | End: 2019-05-26 | Stop reason: SDUPTHER

## 2019-04-29 ENCOUNTER — OFFICE VISIT (OUTPATIENT)
Dept: FAMILY MEDICINE CLINIC | Facility: MEDICAL CENTER | Age: 84
End: 2019-04-29
Payer: MEDICARE

## 2019-04-29 VITALS
SYSTOLIC BLOOD PRESSURE: 120 MMHG | WEIGHT: 149.8 LBS | DIASTOLIC BLOOD PRESSURE: 78 MMHG | HEART RATE: 83 BPM | BODY MASS INDEX: 26.54 KG/M2

## 2019-04-29 DIAGNOSIS — R09.89 BILATERAL CAROTID BRUITS: ICD-10-CM

## 2019-04-29 DIAGNOSIS — I10 ESSENTIAL HYPERTENSION: Chronic | ICD-10-CM

## 2019-04-29 DIAGNOSIS — E78.00 PURE HYPERCHOLESTEROLEMIA: Primary | ICD-10-CM

## 2019-04-29 PROCEDURE — 99214 OFFICE O/P EST MOD 30 MIN: CPT | Performed by: FAMILY MEDICINE

## 2019-05-26 DIAGNOSIS — I10 ESSENTIAL HYPERTENSION: ICD-10-CM

## 2019-05-28 RX ORDER — AMLODIPINE BESYLATE 10 MG/1
TABLET ORAL
Qty: 90 TABLET | Refills: 1 | Status: SHIPPED | OUTPATIENT
Start: 2019-05-28 | End: 2019-11-24 | Stop reason: SDUPTHER

## 2019-06-24 ENCOUNTER — OFFICE VISIT (OUTPATIENT)
Dept: FAMILY MEDICINE CLINIC | Facility: MEDICAL CENTER | Age: 84
End: 2019-06-24
Payer: MEDICARE

## 2019-06-24 VITALS
SYSTOLIC BLOOD PRESSURE: 120 MMHG | HEART RATE: 78 BPM | BODY MASS INDEX: 26.75 KG/M2 | TEMPERATURE: 97.4 F | WEIGHT: 151 LBS | OXYGEN SATURATION: 98 % | DIASTOLIC BLOOD PRESSURE: 72 MMHG

## 2019-06-24 DIAGNOSIS — T63.484A LOCAL REACTION TO INSECT STING, UNDETERMINED INTENT, INITIAL ENCOUNTER: Primary | ICD-10-CM

## 2019-06-24 PROCEDURE — 99213 OFFICE O/P EST LOW 20 MIN: CPT | Performed by: FAMILY MEDICINE

## 2019-06-24 RX ORDER — METHYLPREDNISOLONE 4 MG/1
TABLET ORAL
Qty: 21 EACH | Refills: 0 | Status: SHIPPED | OUTPATIENT
Start: 2019-06-24 | End: 2019-09-11 | Stop reason: ALTCHOICE

## 2019-08-16 ENCOUNTER — HOSPITAL ENCOUNTER (OUTPATIENT)
Dept: RADIOLOGY | Facility: MEDICAL CENTER | Age: 84
Discharge: HOME | End: 2019-08-16
Payer: MEDICARE

## 2019-08-16 ENCOUNTER — APPOINTMENT (OUTPATIENT)
Dept: LAB | Facility: MEDICAL CENTER | Age: 84
End: 2019-08-16
Payer: MEDICARE

## 2019-08-16 DIAGNOSIS — I10 ESSENTIAL HYPERTENSION: Chronic | ICD-10-CM

## 2019-08-16 DIAGNOSIS — R09.89 BILATERAL CAROTID BRUITS: ICD-10-CM

## 2019-08-16 DIAGNOSIS — E78.00 PURE HYPERCHOLESTEROLEMIA: ICD-10-CM

## 2019-08-16 LAB
ALBUMIN SERPL BCP-MCNC: 4.1 G/DL (ref 3.5–5)
ALP SERPL-CCNC: 66 U/L (ref 46–116)
ALT SERPL W P-5'-P-CCNC: 24 U/L (ref 12–78)
ANION GAP SERPL CALCULATED.3IONS-SCNC: 9 MMOL/L (ref 4–13)
AST SERPL W P-5'-P-CCNC: 19 U/L (ref 5–45)
BILIRUB SERPL-MCNC: 0.38 MG/DL (ref 0.2–1)
BUN SERPL-MCNC: 20 MG/DL (ref 5–25)
CALCIUM SERPL-MCNC: 9.6 MG/DL (ref 8.3–10.1)
CHLORIDE SERPL-SCNC: 110 MMOL/L (ref 100–108)
CHOLEST SERPL-MCNC: 230 MG/DL (ref 50–200)
CO2 SERPL-SCNC: 28 MMOL/L (ref 21–32)
CREAT SERPL-MCNC: 1.33 MG/DL (ref 0.6–1.3)
ERYTHROCYTE [DISTWIDTH] IN BLOOD BY AUTOMATED COUNT: 12.8 % (ref 11.6–15.1)
EST. AVERAGE GLUCOSE BLD GHB EST-MCNC: 117 MG/DL
GFR SERPL CREATININE-BSD FRML MDRD: 37 ML/MIN/1.73SQ M
GLUCOSE P FAST SERPL-MCNC: 84 MG/DL (ref 65–99)
HBA1C MFR BLD: 5.7 % (ref 4.2–6.3)
HCT VFR BLD AUTO: 42.9 % (ref 34.8–46.1)
HDLC SERPL-MCNC: 61 MG/DL (ref 40–60)
HGB BLD-MCNC: 12.9 G/DL (ref 11.5–15.4)
LDLC SERPL CALC-MCNC: 146 MG/DL (ref 0–100)
MCH RBC QN AUTO: 28.8 PG (ref 26.8–34.3)
MCHC RBC AUTO-ENTMCNC: 30.1 G/DL (ref 31.4–37.4)
MCV RBC AUTO: 96 FL (ref 82–98)
NONHDLC SERPL-MCNC: 169 MG/DL
PLATELET # BLD AUTO: 245 THOUSANDS/UL (ref 149–390)
PMV BLD AUTO: 11.9 FL (ref 8.9–12.7)
POTASSIUM SERPL-SCNC: 4.4 MMOL/L (ref 3.5–5.3)
PROT SERPL-MCNC: 7.5 G/DL (ref 6.4–8.2)
RBC # BLD AUTO: 4.48 MILLION/UL (ref 3.81–5.12)
SODIUM SERPL-SCNC: 147 MMOL/L (ref 136–145)
TRIGL SERPL-MCNC: 113 MG/DL
TSH SERPL DL<=0.05 MIU/L-ACNC: 1.65 UIU/ML (ref 0.36–3.74)
WBC # BLD AUTO: 6.12 THOUSAND/UL (ref 4.31–10.16)

## 2019-08-16 PROCEDURE — 85027 COMPLETE CBC AUTOMATED: CPT

## 2019-08-16 PROCEDURE — 80061 LIPID PANEL: CPT

## 2019-08-16 PROCEDURE — 84443 ASSAY THYROID STIM HORMONE: CPT

## 2019-08-16 PROCEDURE — 83036 HEMOGLOBIN GLYCOSYLATED A1C: CPT

## 2019-08-16 PROCEDURE — 93880 EXTRACRANIAL BILAT STUDY: CPT

## 2019-08-16 PROCEDURE — 36415 COLL VENOUS BLD VENIPUNCTURE: CPT

## 2019-08-16 PROCEDURE — 80053 COMPREHEN METABOLIC PANEL: CPT

## 2019-08-17 PROCEDURE — 93880 EXTRACRANIAL BILAT STUDY: CPT | Performed by: SURGERY

## 2019-08-29 ENCOUNTER — TELEPHONE (OUTPATIENT)
Dept: FAMILY MEDICINE CLINIC | Facility: MEDICAL CENTER | Age: 84
End: 2019-08-29

## 2019-08-29 DIAGNOSIS — R79.89 ELEVATED SERUM CREATININE: Primary | ICD-10-CM

## 2019-08-29 NOTE — TELEPHONE ENCOUNTER
FYI:  [facility] has not taken any new medications or supplements  She will hydrate and get BMP / UA checked next week after the holiday  Orders placed

## 2019-08-29 NOTE — TELEPHONE ENCOUNTER
----- Message from Lidia Fox MD sent at 8/29/2019  9:01 AM EDT -----  Notify Bettyof  blood work results  Cholesterol is very good for her at 230  Glucose is okay  Kidney function looks a little reduced compared to previous  I would like her to repeat a nonfasting BMP as a UA  Did  she start any supplements or anything?

## 2019-09-04 ENCOUNTER — APPOINTMENT (OUTPATIENT)
Dept: LAB | Facility: MEDICAL CENTER | Age: 84
End: 2019-09-04
Payer: MEDICARE

## 2019-09-04 DIAGNOSIS — R79.89 ELEVATED SERUM CREATININE: ICD-10-CM

## 2019-09-04 LAB
ANION GAP SERPL CALCULATED.3IONS-SCNC: 4 MMOL/L (ref 4–13)
BILIRUB UR QL STRIP: NEGATIVE
BUN SERPL-MCNC: 23 MG/DL (ref 5–25)
CALCIUM SERPL-MCNC: 9.4 MG/DL (ref 8.3–10.1)
CHLORIDE SERPL-SCNC: 106 MMOL/L (ref 100–108)
CLARITY UR: ABNORMAL
CO2 SERPL-SCNC: 28 MMOL/L (ref 21–32)
COLOR UR: YELLOW
CREAT SERPL-MCNC: 1.12 MG/DL (ref 0.6–1.3)
GFR SERPL CREATININE-BSD FRML MDRD: 46 ML/MIN/1.73SQ M
GLUCOSE SERPL-MCNC: 106 MG/DL (ref 65–140)
GLUCOSE UR STRIP-MCNC: NEGATIVE MG/DL
HGB UR QL STRIP.AUTO: ABNORMAL
KETONES UR STRIP-MCNC: NEGATIVE MG/DL
LEUKOCYTE ESTERASE UR QL STRIP: ABNORMAL
NITRITE UR QL STRIP: NEGATIVE
PH UR STRIP.AUTO: 8.5 [PH]
POTASSIUM SERPL-SCNC: 4.8 MMOL/L (ref 3.5–5.3)
PROT UR STRIP-MCNC: ABNORMAL MG/DL
SODIUM SERPL-SCNC: 138 MMOL/L (ref 136–145)
SP GR UR STRIP.AUTO: 1.01 (ref 1–1.03)
UROBILINOGEN UR QL STRIP.AUTO: 0.2 E.U./DL

## 2019-09-04 PROCEDURE — 81001 URINALYSIS AUTO W/SCOPE: CPT

## 2019-09-04 PROCEDURE — 36415 COLL VENOUS BLD VENIPUNCTURE: CPT

## 2019-09-04 PROCEDURE — 80048 BASIC METABOLIC PNL TOTAL CA: CPT

## 2019-09-05 ENCOUNTER — APPOINTMENT (OUTPATIENT)
Dept: LAB | Facility: MEDICAL CENTER | Age: 84
End: 2019-09-05
Payer: MEDICARE

## 2019-09-05 ENCOUNTER — TELEPHONE (OUTPATIENT)
Dept: FAMILY MEDICINE CLINIC | Facility: MEDICAL CENTER | Age: 84
End: 2019-09-05

## 2019-09-05 DIAGNOSIS — R82.90 ABNORMAL URINALYSIS: Primary | ICD-10-CM

## 2019-09-05 DIAGNOSIS — R82.90 ABNORMAL URINALYSIS: ICD-10-CM

## 2019-09-05 LAB
AMORPH PHOS CRY URNS QL MICRO: ABNORMAL /HPF
BACTERIA UR QL AUTO: ABNORMAL /HPF
FINE GRAN CASTS URNS QL MICRO: ABNORMAL /LPF
NON-SQ EPI CELLS URNS QL MICRO: ABNORMAL /HPF
RBC #/AREA URNS AUTO: ABNORMAL /HPF
TRI-PHOS CRY URNS QL MICRO: ABNORMAL /HPF
WBC #/AREA URNS AUTO: ABNORMAL /HPF

## 2019-09-05 PROCEDURE — 87086 URINE CULTURE/COLONY COUNT: CPT

## 2019-09-05 NOTE — TELEPHONE ENCOUNTER
Dana Funez has no symptoms, except she says her abdomen feels a little bloated  No pain, burning or frequency noted  She will come over to the lab today to provide a specimen for a culture, order placed  Please return this message with further instructions so the clinical staff can watch for the culture result next week to address further

## 2019-09-05 NOTE — TELEPHONE ENCOUNTER
----- Message from Pilar Garcia MD sent at 9/5/2019  9:35 AM EDT -----  UA indicates she could have an infection  Any  symptoms?    Needs urine culture

## 2019-09-07 LAB — BACTERIA UR CULT: NORMAL

## 2019-09-11 ENCOUNTER — OFFICE VISIT (OUTPATIENT)
Dept: FAMILY MEDICINE CLINIC | Facility: MEDICAL CENTER | Age: 84
End: 2019-09-11
Payer: MEDICARE

## 2019-09-11 ENCOUNTER — APPOINTMENT (OUTPATIENT)
Dept: RADIOLOGY | Facility: MEDICAL CENTER | Age: 84
End: 2019-09-11
Payer: MEDICARE

## 2019-09-11 ENCOUNTER — TELEPHONE (OUTPATIENT)
Dept: OTHER | Facility: OTHER | Age: 84
End: 2019-09-11

## 2019-09-11 VITALS
DIASTOLIC BLOOD PRESSURE: 90 MMHG | SYSTOLIC BLOOD PRESSURE: 162 MMHG | TEMPERATURE: 97.1 F | BODY MASS INDEX: 26.04 KG/M2 | HEART RATE: 93 BPM | OXYGEN SATURATION: 91 % | WEIGHT: 147 LBS

## 2019-09-11 DIAGNOSIS — R05.9 COUGH: Primary | ICD-10-CM

## 2019-09-11 DIAGNOSIS — R79.81 LOW O2 SATURATION: ICD-10-CM

## 2019-09-11 DIAGNOSIS — R06.2 WHEEZING: ICD-10-CM

## 2019-09-11 PROCEDURE — 71046 X-RAY EXAM CHEST 2 VIEWS: CPT

## 2019-09-11 PROCEDURE — 99214 OFFICE O/P EST MOD 30 MIN: CPT | Performed by: FAMILY MEDICINE

## 2019-09-11 RX ORDER — AZITHROMYCIN 250 MG/1
TABLET, FILM COATED ORAL
Qty: 6 TABLET | Refills: 0 | Status: SHIPPED | OUTPATIENT
Start: 2019-09-11 | End: 2019-09-15

## 2019-09-11 RX ORDER — PREDNISONE 10 MG/1
40 TABLET ORAL DAILY
Qty: 20 TABLET | Refills: 0 | Status: SHIPPED | OUTPATIENT
Start: 2019-09-11 | End: 2019-09-16

## 2019-09-11 NOTE — PROGRESS NOTES
Assessment/Plan:    No problem-specific Assessment & Plan notes found for this encounter  Diagnoses and all orders for this visit:    Cough  -     azithromycin (ZITHROMAX) 250 mg tablet; Take 2 tablets today then 1 tablet daily x 4 days  Low O2 saturation  -     XR chest pa & lateral; Future  Wheezing  -     predniSONE 10 mg tablet; Take 4 tablets (40 mg total) by mouth daily for 5 days  New onset cough in a smoker will require treatment and workup  Suspicious for COPD exacerbation despite patient not having a diagnosis of COPD  Likely a sequela of an upper respiratory infection based on symptoms and exam findings  Pneumonia possible as well  Will check an x-ray  Will have patient start a course of antibiotics with azithromycin and steroids with prednisone  Call office or go to the ER if symptoms worsen  Follow-up in one week if symptoms persist or sooner if needed  Subjective:      Patient ID: Benjamin Magallanes is a 81 y o  female  Patient presents today with her daughter  She has a cough  Started about one week ago  Described as productive of green sputum  Has associated fatigue, sore throat and nasal congestion  Has been getting progressively worse the past week  Exacerbated by exertion  Improved with rest   Patient is a known smoker  No history of asthma  No known COPD  The following portions of the patient's history were reviewed and updated as appropriate:   She  has a past medical history of Hypertension  She   Patient Active Problem List    Diagnosis Date Noted    Primary osteoarthritis of both knees 03/29/2018    Essential hypertension 02/16/2018    Carotid artery stenosis 06/27/2016    Carotid bruit 04/29/2015    Hyperlipidemia 10/05/2012    Osteopenia 10/05/2012     She  has a past surgical history that includes pr i&d perirectal abscess (N/A, 2/17/2018) and Colonoscopy  Her family history includes Heart disease in her mother    She  reports that she has been smoking cigarettes  She has been smoking about 1 00 pack per day  She has never used smokeless tobacco  She reports that she does not drink alcohol or use drugs  Current Outpatient Medications   Medication Sig Dispense Refill    amLODIPine (NORVASC) 10 mg tablet TAKE 1 TABLET DAILY  90 tablet 1    aspirin (ECOTRIN LOW STRENGTH) 81 mg EC tablet Take 81 mg by mouth daily      Calcium 600-200 MG-UNIT per tablet Take by mouth      azithromycin (ZITHROMAX) 250 mg tablet Take 2 tablets today then 1 tablet daily x 4 days 6 tablet 0    predniSONE 10 mg tablet Take 4 tablets (40 mg total) by mouth daily for 5 days 20 tablet 0     No current facility-administered medications for this visit  Current Outpatient Medications on File Prior to Visit   Medication Sig    amLODIPine (NORVASC) 10 mg tablet TAKE 1 TABLET DAILY   aspirin (ECOTRIN LOW STRENGTH) 81 mg EC tablet Take 81 mg by mouth daily    Calcium 600-200 MG-UNIT per tablet Take by mouth    [DISCONTINUED] methylPREDNISolone 4 MG tablet therapy pack Use as directed on package     No current facility-administered medications on file prior to visit  She is allergic to bee venom; codeine; influenza vaccines; sulfa antibiotics; and penicillins       Review of Systems   Constitutional: Negative for fever  Respiratory: Negative for shortness of breath  Cardiovascular: Negative for chest pain  Objective:      /90 (BP Location: Left arm, Patient Position: Sitting, Cuff Size: Adult)   Pulse 93   Temp (!) 97 1 °F (36 2 °C)   Wt 66 7 kg (147 lb)   SpO2 91%   BMI 26 04 kg/m²          Physical Exam   Constitutional: Vital signs are normal  She appears well-developed and well-nourished  HENT:   Head: Normocephalic and atraumatic  Right Ear: Tympanic membrane, external ear and ear canal normal  Right ear middle ear effusion: Serous fluid of the middle ear     Left Ear: Tympanic membrane, external ear and ear canal normal  Left ear middle ear effusion: Serous fluid of the middle ear  Nose: Mucosal edema and rhinorrhea present  Mouth/Throat: Uvula is midline and mucous membranes are normal  No oropharyngeal exudate (Postnasal drainage is however present  )  Eyes: Pupils are equal, round, and reactive to light  Conjunctivae, EOM and lids are normal    Neck: Trachea normal    Cardiovascular: Normal rate, regular rhythm and normal heart sounds  No murmur heard  Pulmonary/Chest: Effort normal  She has decreased breath sounds in the right middle field, the right lower field, the left middle field and the left lower field  She has wheezes in the right upper field, the right middle field, the right lower field, the left upper field, the left middle field and the left lower field  Lymphadenopathy:     She has cervical adenopathy  Right cervical: Superficial cervical adenopathy present  Left cervical: Superficial cervical adenopathy present

## 2019-09-11 NOTE — TELEPHONE ENCOUNTER
Tiger Text Dr Ladi Storm @0845    Actual Text:  [phone]/ Nichelle/ Dagoberto/ PT  Alexa Sutherland 1935/ STAT Results      Informed caller to call service back within 20 minutes if no response

## 2019-09-12 ENCOUNTER — TELEPHONE (OUTPATIENT)
Dept: FAMILY MEDICINE CLINIC | Facility: MEDICAL CENTER | Age: 84
End: 2019-09-12

## 2019-09-12 NOTE — TELEPHONE ENCOUNTER
FYI: Aware, did receive message last evening and started meds last night  Says she is feeling better already  Advised hydration, rest and appointment scheduled for 2 weeks with Dr Rashad Munoz

## 2019-09-12 NOTE — TELEPHONE ENCOUNTER
----- Message from Marly Warren DO sent at 9/12/2019  8:04 AM EDT -----  Chest x-ray results reviewed  Attempted to call patient last night but I did get her answering machine and left a message about the findings  Please inform patient or her daughter that the x-ray suggests pneumonia  She should take antibiotics and the steroids that were prescribed  Have her follow up with Dr Julianna French in about two weeks as she will need a repeat chest x-ray

## 2019-09-17 ENCOUNTER — TELEPHONE (OUTPATIENT)
Dept: FAMILY MEDICINE CLINIC | Facility: MEDICAL CENTER | Age: 84
End: 2019-09-17

## 2019-09-17 NOTE — TELEPHONE ENCOUNTER
Five days of therapy should be all that is needed for Zithromax  However if she is still coughing we should see her  I would give another day or 2; have her  seen on Thursday if persists  Does she have a cough med?   The

## 2019-09-17 NOTE — TELEPHONE ENCOUNTER
Pt's dtr called to let Dr Francheska Redman know pt is much better, but she is still coughing  She wondered if she should have another Zpack    Please advise

## 2019-09-17 NOTE — TELEPHONE ENCOUNTER
MARIA INES: Aware, spoke with both [employer]  They will reach out Thursday morning if not improved enough  It is loose and she is able to cough the mucous up  Not using any cough meds, advised plain Robitussin or Delsym for now

## 2019-11-18 ENCOUNTER — OFFICE VISIT (OUTPATIENT)
Dept: FAMILY MEDICINE CLINIC | Facility: MEDICAL CENTER | Age: 84
End: 2019-11-18
Payer: MEDICARE

## 2019-11-18 VITALS
HEART RATE: 70 BPM | BODY MASS INDEX: 27.19 KG/M2 | DIASTOLIC BLOOD PRESSURE: 70 MMHG | SYSTOLIC BLOOD PRESSURE: 148 MMHG | WEIGHT: 153.5 LBS | RESPIRATION RATE: 16 BRPM

## 2019-11-18 DIAGNOSIS — Z86.73 HX OF STROKE WITHOUT RESIDUAL DEFICITS: ICD-10-CM

## 2019-11-18 DIAGNOSIS — I10 ESSENTIAL HYPERTENSION: Chronic | ICD-10-CM

## 2019-11-18 DIAGNOSIS — I65.23 BILATERAL CAROTID ARTERY STENOSIS: Primary | ICD-10-CM

## 2019-11-18 PROCEDURE — 99214 OFFICE O/P EST MOD 30 MIN: CPT | Performed by: FAMILY MEDICINE

## 2019-11-18 NOTE — PROGRESS NOTES
Josse Dykes is here for 6 month checkup  She says she has been doing well overall  She says she has pain in her right hip    Started recently  Last 1-2 months  Achey pain  No pain radiation  No histoyr fo bck problems  No left hip pain  Still dancing  Takes Tylenol Extra Strength and uses Aspercreme    Uses Tylenol twice daily  She remains very active caringfor her home and yard and dancing once a week   She said she does not recall if she got her carotid ultrasound done  Order was given last visit  O: /70   Pulse 70   Resp 16   Wt 69 6 kg (153 lb 8 oz)   BMI 27 19 kg/m²   Neck no adenopathy thyromegaly   Bilateral bruits  Chest is clear with good breath sounds  Cardiac regular rate without murmur    Assessment  1  Hypertension-controlled with amlodipine  2  Carotid artery stenosis-reminded to get carotid ultrasound done  Continue aspirin  3   Right hip pain-suspect bursitis  Advised ice and Tylenol as needed  Offered Ortho referral which she declines at this time    Plan  Carotid ultrasound  Recheck 6 months

## 2019-11-24 DIAGNOSIS — I10 ESSENTIAL HYPERTENSION: ICD-10-CM

## 2019-11-25 RX ORDER — AMLODIPINE BESYLATE 10 MG/1
TABLET ORAL
Qty: 90 TABLET | Refills: 1 | Status: SHIPPED | OUTPATIENT
Start: 2019-11-25 | End: 2020-06-02

## 2019-12-20 ENCOUNTER — TELEPHONE (OUTPATIENT)
Dept: FAMILY MEDICINE CLINIC | Facility: MEDICAL CENTER | Age: 84
End: 2019-12-20

## 2019-12-20 ENCOUNTER — OFFICE VISIT (OUTPATIENT)
Dept: FAMILY MEDICINE CLINIC | Facility: MEDICAL CENTER | Age: 84
End: 2019-12-20
Payer: MEDICARE

## 2019-12-20 VITALS
TEMPERATURE: 98.1 F | SYSTOLIC BLOOD PRESSURE: 146 MMHG | RESPIRATION RATE: 16 BRPM | WEIGHT: 153 LBS | BODY MASS INDEX: 27.1 KG/M2 | HEART RATE: 70 BPM | DIASTOLIC BLOOD PRESSURE: 76 MMHG

## 2019-12-20 DIAGNOSIS — K62.5 RECTAL BLEEDING: Primary | ICD-10-CM

## 2019-12-20 PROCEDURE — 99213 OFFICE O/P EST LOW 20 MIN: CPT | Performed by: FAMILY MEDICINE

## 2019-12-20 NOTE — TELEPHONE ENCOUNTER
C/orectal bleeding x 4 days, only at a bowel movement  Bright red  Denies any abdominal pain, constipation, stool is brown, not tarry, no fever or chills   Hx of Polyps, seen Dr Suzi Boyd in the past  Will call Dr Porsche Luis

## 2019-12-20 NOTE — PROGRESS NOTES
Patient has noticed some bright red blood at the stool  There is no pain  This has been current over the last couple of days  She noticed it after having a bowel movement  /76 (BP Location: Left arm, Patient Position: Sitting, Cuff Size: Adult)   Pulse 70   Temp 98 1 °F (36 7 °C) (Oral)   Resp 16   Wt 69 4 kg (153 lb)   BMI 27 10 kg/m²     Anus is intact  There is a small amount of blood at the anal verge  I do not appreciate any external hemorrhoids or fissure  Will begin [facility]  and refer to GI for further evaluation

## 2020-01-10 ENCOUNTER — TELEPHONE (OUTPATIENT)
Dept: FAMILY MEDICINE CLINIC | Facility: MEDICAL CENTER | Age: 85
End: 2020-01-10

## 2020-05-27 ENCOUNTER — OFFICE VISIT (OUTPATIENT)
Dept: FAMILY MEDICINE CLINIC | Facility: MEDICAL CENTER | Age: 85
End: 2020-05-27
Payer: MEDICARE

## 2020-05-27 VITALS
RESPIRATION RATE: 14 BRPM | TEMPERATURE: 98.5 F | DIASTOLIC BLOOD PRESSURE: 78 MMHG | SYSTOLIC BLOOD PRESSURE: 142 MMHG | WEIGHT: 161.13 LBS | BODY MASS INDEX: 29.65 KG/M2 | HEART RATE: 100 BPM | HEIGHT: 62 IN

## 2020-05-27 DIAGNOSIS — I65.23 BILATERAL CAROTID ARTERY STENOSIS: ICD-10-CM

## 2020-05-27 DIAGNOSIS — E78.00 PURE HYPERCHOLESTEROLEMIA: ICD-10-CM

## 2020-05-27 DIAGNOSIS — I10 ESSENTIAL HYPERTENSION: Primary | Chronic | ICD-10-CM

## 2020-05-27 DIAGNOSIS — M85.80 OSTEOPENIA, UNSPECIFIED LOCATION: ICD-10-CM

## 2020-05-27 DIAGNOSIS — Z00.00 MEDICARE ANNUAL WELLNESS VISIT, SUBSEQUENT: ICD-10-CM

## 2020-05-27 PROCEDURE — 99214 OFFICE O/P EST MOD 30 MIN: CPT | Performed by: FAMILY MEDICINE

## 2020-05-27 PROCEDURE — 1125F AMNT PAIN NOTED PAIN PRSNT: CPT | Performed by: FAMILY MEDICINE

## 2020-05-27 PROCEDURE — 1170F FXNL STATUS ASSESSED: CPT | Performed by: FAMILY MEDICINE

## 2020-05-27 PROCEDURE — G0439 PPPS, SUBSEQ VISIT: HCPCS | Performed by: FAMILY MEDICINE

## 2020-05-27 PROCEDURE — 3008F BODY MASS INDEX DOCD: CPT | Performed by: FAMILY MEDICINE

## 2020-05-27 PROCEDURE — 3078F DIAST BP <80 MM HG: CPT | Performed by: FAMILY MEDICINE

## 2020-05-27 PROCEDURE — 1160F RVW MEDS BY RX/DR IN RCRD: CPT | Performed by: FAMILY MEDICINE

## 2020-05-27 PROCEDURE — 3077F SYST BP >= 140 MM HG: CPT | Performed by: FAMILY MEDICINE

## 2020-06-01 DIAGNOSIS — I10 ESSENTIAL HYPERTENSION: ICD-10-CM

## 2020-06-02 RX ORDER — AMLODIPINE BESYLATE 10 MG/1
TABLET ORAL
Qty: 90 TABLET | Refills: 1 | Status: SHIPPED | OUTPATIENT
Start: 2020-06-02 | End: 2020-12-01

## 2020-11-30 DIAGNOSIS — I10 ESSENTIAL HYPERTENSION: ICD-10-CM

## 2020-12-01 RX ORDER — AMLODIPINE BESYLATE 10 MG/1
TABLET ORAL
Qty: 90 TABLET | Refills: 1 | Status: SHIPPED | OUTPATIENT
Start: 2020-12-01 | End: 2021-05-22

## 2021-05-18 DIAGNOSIS — K62.5 RECTAL BLEEDING: ICD-10-CM

## 2021-05-19 DIAGNOSIS — I10 ESSENTIAL HYPERTENSION: ICD-10-CM

## 2021-05-22 RX ORDER — AMLODIPINE BESYLATE 10 MG/1
TABLET ORAL
Qty: 90 TABLET | Refills: 1 | Status: SHIPPED | OUTPATIENT
Start: 2021-05-22 | End: 2021-11-29

## 2021-06-23 ENCOUNTER — OFFICE VISIT (OUTPATIENT)
Dept: FAMILY MEDICINE CLINIC | Facility: MEDICAL CENTER | Age: 86
End: 2021-06-23
Payer: MEDICARE

## 2021-06-23 VITALS
HEIGHT: 64 IN | SYSTOLIC BLOOD PRESSURE: 146 MMHG | DIASTOLIC BLOOD PRESSURE: 78 MMHG | HEART RATE: 93 BPM | WEIGHT: 157 LBS | OXYGEN SATURATION: 96 % | TEMPERATURE: 98.3 F | BODY MASS INDEX: 26.8 KG/M2

## 2021-06-23 DIAGNOSIS — E78.00 PURE HYPERCHOLESTEROLEMIA: Primary | ICD-10-CM

## 2021-06-23 DIAGNOSIS — I10 ESSENTIAL HYPERTENSION: ICD-10-CM

## 2021-06-23 DIAGNOSIS — I65.23 BILATERAL CAROTID ARTERY STENOSIS: ICD-10-CM

## 2021-06-23 PROCEDURE — 99214 OFFICE O/P EST MOD 30 MIN: CPT | Performed by: FAMILY MEDICINE

## 2021-06-23 PROCEDURE — 1123F ACP DISCUSS/DSCN MKR DOCD: CPT | Performed by: FAMILY MEDICINE

## 2021-06-24 NOTE — PROGRESS NOTES
Juan David Bennett is here accompanied by her daughter Walter Ceja today  she is complete started about a some discomfort on the bottom of her right foot  Started a few days ago  She also noticed a rash  No history of trauma  No other areas on the body  No previous history  Daughter does think it is fading  daughter is concerned about patient's memory  She does seem to repeat herself frequently  Misplaces things  Difficulty recalling some events  She has been under lot of stress this past year with loss of friends due to [place] or illness  She does continue to drive however only locally  No f[employer]enders or getting lost that she knows of  There has been some problems with paying the bills  Patient lives with her daughter  O: /78 (BP Location: Left arm, Patient Position: Sitting, Cuff Size: Adult)   Pulse 93   Temp 98 3 °F (36 8 °C)   Ht 5' 4" (1 626 m)   Wt 71 2 kg (157 lb)   SpO2 96%   BMI 26 95 kg/m²      Both feet appear normal   Some mild varicosities in the lower extremities  Plantar surface mid foot right shows a dime-sized bluish purple papular grouped  Lesion  It is only slightly tender  No other lesions are noted anywhere else  Foot is warm  No erythema    Assessment   1  Lesion right foot appears- most likely traumatic however consider vasculitis  She does not have any other lesions  And this does appear to be fading  She has an appointment with Podiatry next week and I have asked her to consult her regarding this  Notify me if  Develops further   2  Cognitive changes -will do a mini-mental status exam    Plan  As above  Due for blood work and health maintenance

## 2021-06-25 ENCOUNTER — IMMUNIZATIONS (OUTPATIENT)
Dept: FAMILY MEDICINE CLINIC | Facility: HOSPITAL | Age: 86
End: 2021-06-25

## 2021-06-25 DIAGNOSIS — Z23 ENCOUNTER FOR IMMUNIZATION: Primary | ICD-10-CM

## 2021-06-25 PROCEDURE — 91300 SARS-COV-2 / COVID-19 MRNA VACCINE (PFIZER-BIONTECH) 30 MCG: CPT

## 2021-06-25 PROCEDURE — 0001A SARS-COV-2 / COVID-19 MRNA VACCINE (PFIZER-BIONTECH) 30 MCG: CPT

## 2021-06-29 ENCOUNTER — TELEPHONE (OUTPATIENT)
Dept: FAMILY MEDICINE CLINIC | Facility: MEDICAL CENTER | Age: 86
End: 2021-06-29

## 2021-06-29 NOTE — TELEPHONE ENCOUNTER
Patient's daughter Dede Morgan would like to find out the results on the Mini Mental Test  Would like a call back at [phone]

## 2021-07-16 ENCOUNTER — IMMUNIZATIONS (OUTPATIENT)
Dept: FAMILY MEDICINE CLINIC | Facility: HOSPITAL | Age: 86
End: 2021-07-16

## 2021-07-16 DIAGNOSIS — Z23 ENCOUNTER FOR IMMUNIZATION: Primary | ICD-10-CM

## 2021-07-16 PROCEDURE — 91300 SARS-COV-2 / COVID-19 MRNA VACCINE (PFIZER-BIONTECH) 30 MCG: CPT

## 2021-07-16 PROCEDURE — 0002A SARS-COV-2 / COVID-19 MRNA VACCINE (PFIZER-BIONTECH) 30 MCG: CPT

## 2021-09-10 NOTE — TELEPHONE ENCOUNTER
Discussed dementia dx with daughter Nory Bone     She has functional reasonably well without any behavioral concerns  Offered referral to Geriatrics if desires

## 2021-09-15 ENCOUNTER — TELEPHONE (OUTPATIENT)
Dept: FAMILY MEDICINE CLINIC | Facility: MEDICAL CENTER | Age: 86
End: 2021-09-15

## 2021-09-15 NOTE — TELEPHONE ENCOUNTER
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

## 2021-09-16 ENCOUNTER — OFFICE VISIT (OUTPATIENT)
Dept: FAMILY MEDICINE CLINIC | Facility: MEDICAL CENTER | Age: 86
End: 2021-09-16
Payer: MEDICARE

## 2021-09-16 VITALS
WEIGHT: 152 LBS | TEMPERATURE: 97.5 F | DIASTOLIC BLOOD PRESSURE: 78 MMHG | SYSTOLIC BLOOD PRESSURE: 120 MMHG | HEIGHT: 64 IN | HEART RATE: 70 BPM | BODY MASS INDEX: 25.95 KG/M2

## 2021-09-16 DIAGNOSIS — H02.401 PTOSIS OF RIGHT EYELID: Primary | ICD-10-CM

## 2021-09-16 PROCEDURE — 99213 OFFICE O/P EST LOW 20 MIN: CPT | Performed by: FAMILY MEDICINE

## 2021-09-16 RX ORDER — MOXIFLOXACIN 5 MG/ML
SOLUTION/ DROPS OPHTHALMIC
COMMUNITY
Start: 2021-09-15

## 2021-11-27 DIAGNOSIS — I10 ESSENTIAL HYPERTENSION: ICD-10-CM

## 2021-11-29 RX ORDER — AMLODIPINE BESYLATE 10 MG/1
TABLET ORAL
Qty: 90 TABLET | Refills: 1 | Status: SHIPPED | OUTPATIENT
Start: 2021-11-29 | End: 2022-05-23

## 2022-05-23 DIAGNOSIS — I10 ESSENTIAL HYPERTENSION: ICD-10-CM

## 2022-05-23 RX ORDER — AMLODIPINE BESYLATE 10 MG/1
TABLET ORAL
Qty: 90 TABLET | Refills: 1 | Status: SHIPPED | OUTPATIENT
Start: 2022-05-23

## 2022-05-23 NOTE — TELEPHONE ENCOUNTER
Please reach out to patient to schedule with  new PCP   Once scheduled forward request to that provider to address refill request

## 2022-10-25 ENCOUNTER — OFFICE VISIT (OUTPATIENT)
Dept: FAMILY MEDICINE CLINIC | Facility: MEDICAL CENTER | Age: 87
End: 2022-10-25
Payer: MEDICARE

## 2022-10-25 VITALS
SYSTOLIC BLOOD PRESSURE: 124 MMHG | BODY MASS INDEX: 24.92 KG/M2 | HEIGHT: 64 IN | WEIGHT: 146 LBS | DIASTOLIC BLOOD PRESSURE: 64 MMHG | OXYGEN SATURATION: 96 % | RESPIRATION RATE: 18 BRPM | TEMPERATURE: 99.9 F | HEART RATE: 81 BPM

## 2022-10-25 DIAGNOSIS — Z86.79 HISTORY OF CEREBRAL ANEURYSM REPAIR: ICD-10-CM

## 2022-10-25 DIAGNOSIS — R01.1 CARDIAC MURMUR: ICD-10-CM

## 2022-10-25 DIAGNOSIS — Z00.00 HEALTHCARE MAINTENANCE: ICD-10-CM

## 2022-10-25 DIAGNOSIS — M85.80 OSTEOPENIA, UNSPECIFIED LOCATION: ICD-10-CM

## 2022-10-25 DIAGNOSIS — I10 ESSENTIAL HYPERTENSION: Chronic | ICD-10-CM

## 2022-10-25 DIAGNOSIS — Z76.89 ENCOUNTER TO ESTABLISH CARE WITH NEW DOCTOR: Primary | ICD-10-CM

## 2022-10-25 DIAGNOSIS — Z98.890 HISTORY OF CEREBRAL ANEURYSM REPAIR: ICD-10-CM

## 2022-10-25 DIAGNOSIS — E78.00 PURE HYPERCHOLESTEROLEMIA: ICD-10-CM

## 2022-10-25 DIAGNOSIS — I65.23 BILATERAL CAROTID ARTERY STENOSIS: ICD-10-CM

## 2022-10-25 PROBLEM — I25.10 CAD (CORONARY ARTERY DISEASE): Status: ACTIVE | Noted: 2022-10-25

## 2022-10-25 PROCEDURE — 99214 OFFICE O/P EST MOD 30 MIN: CPT | Performed by: STUDENT IN AN ORGANIZED HEALTH CARE EDUCATION/TRAINING PROGRAM

## 2022-10-25 PROCEDURE — 99406 BEHAV CHNG SMOKING 3-10 MIN: CPT | Performed by: STUDENT IN AN ORGANIZED HEALTH CARE EDUCATION/TRAINING PROGRAM

## 2022-10-25 NOTE — PROGRESS NOTES
[address] [facility] - Clinic Note  Naz Butt, 10/25/22     Thomas Rubio MRN: [id number] : 1935 Age: 81 y o  Assessment/Plan     1  Encounter to establish care with new doctor    - patient presents to establish with new provider  - she will return in 6 months and sooner as needed    2  Essential hypertension    - hypertension under good control with current management, continue amlodipine 10 mg p o  daily    3  Pure hypercholesterolemia    - >75 y o      4  History of cerebral aneurysm repair    - unspecified date when underwent repair    5  Bilateral carotid artery stenosis    - patient declines carotid ultrasound today  - is currently on aspirin    6  Osteopenia, unspecified location    - continue calcium and vitamin-D supplementation    7  Cardiac murmur    - no chest pain, no palpitations, no syncope, no shortness of breath  - patient would like to discuss with daughter prior to proceeding with echocardiogram as advised    8  Healthcare maintenance    - patient advised about 2nd COVID-19 booster  - refused influenza and pneumonia vaccines today    BMI Counseling: Body mass index is 25 46 kg/m²  The BMI is above normal  Nutrition recommendations include encouraging healthy choices of fruits and vegetables and increasing intake of lean protein  Exercise recommendations include strength training exercises  Rationale for BMI follow-up plan is due to patient being overweight or obese  Falls Plan of Care: balance, strength, and gait training instructions were provided  Home safety education provided  Tobacco Cessation Counseling: Tobacco cessation counseling was provided   The patient is sincerely urged to quit consumption of tobacco  She is not ready to quit tobacco        -Smoking cessation counseling provided  - Almost one-third of all cancers are caused by smoking and there is an especially high incidence of smoking leading to lung, head and neck, cervical, bladder, kidney, and pancreatic cancers  - Persistent tobacco use is consistent with poorer outcomes including complications of treatment, progressive disease, increased comorbidity, and second primaries    - Smoking cessation is important in prevention of cancer, but is even more vital for survivors to improve survival   - Tobacco Use/Cessation  I assessed Lizbeth Richardson to be in a pre-contemplative stage with respect to tobacco use  - I advised patient to quit, and offered support  Lizbeth Richardson acknowledged understanding of treatment plan, all questions answered  Anabella Singer is a 81 y o  female who presents for medication follow-up  Patient has a past medical history including history of cerebral aneurysm status post repair, bilateral carotid artery stenosis, hypertension, hyperlipidemia, osteopenia, and current tobacco use  Patient states she smokes about half pack per day and has cut back some on her own  Her daughter lives with her  Per chart review, history of CAD also noted in an op note in the past-patient is currently on antihypertensive and aspirin  The following portions of the patient's history were reviewed and updated as appropriate: allergies, current medications, past family history, past medical history, past social history, past surgical history and problem list      Past Medical History:   Diagnosis Date   • Hypertension        Allergies   Allergen Reactions   • Bee Venom Edema   • Codeine    • Influenza Vaccines    • Sulfa Antibiotics Hives   • Penicillins Rash       Past Surgical History:   Procedure Laterality Date   • COLONOSCOPY      2009, 2012   • MI I&D PERIRECTAL ABSCESS N/A 2/17/2018    Procedure: INCISION AND DRAINAGE (I&D) PERIRECTAL;  Surgeon: Paramjit Gilbert MD;  Location: [facility];  Service: General       Family History   Problem Relation Age of Onset   • Heart disease Mother        Social History     Socioeconomic History   • Marital status:       Spouse name: None   • Number of children: None   • Years of education: None   • Highest education level: None   Occupational History   • None   Tobacco Use   • Smoking status: Current Every Day Smoker     Packs/day: 1 00     Types: Cigarettes   • Smokeless tobacco: Never Used   Substance and Sexual Activity   • Alcohol use: No   • Drug use: No   • Sexual activity: Not Currently   Other Topics Concern   • None   Social History Narrative    Caffeine use      Social Determinants of Health     Financial Resource Strain: Not on file   Food Insecurity: Not on file   Transportation Needs: Not on file   Physical Activity: Not on file   Stress: Not on file   Social Connections: Not on file   Intimate Partner Violence: Not on file   Housing Stability: Not on file       Current Outpatient Medications   Medication Sig Dispense Refill   • amLODIPine (NORVASC) 10 mg tablet TAKE 1 TABLET BY MOUTH EVERY DAY 90 tablet 1   • aspirin (ECOTRIN LOW STRENGTH) 81 mg EC tablet Take 81 mg by mouth daily     • Calcium 600-200 MG-UNIT per tablet Take by mouth     • moxifloxacin (VIGAMOX) 0 5 % ophthalmic solution        No current facility-administered medications for this visit  Review of Systems   Respiratory: Negative for shortness of breath  Cardiovascular: Negative for chest pain, palpitations and leg swelling  Neurological: Negative for syncope  And as noted in HPI    Objective      /64 (BP Location: Left arm, Patient Position: Sitting, Cuff Size: Adult)   Pulse 81   Temp 99 9 °F (37 7 °C)   Resp 18   Ht 5' 3 5" (1 613 m)   Wt 66 2 kg (146 lb)   SpO2 96%   BMI 25 46 kg/m²     Physical Exam  Vitals reviewed  Constitutional:       General: She is not in acute distress  HENT:      Head: Normocephalic and atraumatic  Nose: Nose normal       Mouth/Throat:      Mouth: Mucous membranes are moist       Pharynx: Oropharynx is clear     Eyes:      Conjunctiva/sclera: Conjunctivae normal       Pupils: Pupils are equal, round, and reactive to light  Comments: Ptosis, right    Cardiovascular:      Rate and Rhythm: Normal rate and regular rhythm  Heart sounds: Murmur heard  Pulmonary:      Effort: Pulmonary effort is normal       Breath sounds: Normal breath sounds  Abdominal:      General: Bowel sounds are normal       Palpations: Abdomen is soft  Tenderness: There is no abdominal tenderness  Musculoskeletal:      Cervical back: Neck supple  Right lower leg: No edema  Left lower leg: No edema  Skin:     General: Skin is warm and dry  Neurological:      Mental Status: She is alert and oriented to person, place, and time  Psychiatric:         Mood and Affect: Mood normal              Some portions of this record may have been generated with voice recognition software  There may be translation, syntax, or grammatical errors  Occasional wrong word or "sound-a-like" substitutions may have occurred due to the inherent limitations of the voice recognition software  Read the chart carefully and recognize, using context, where substations may have occurred  If you have any questions, please contact the dictating provider for clarification or correction, as needed

## 2022-11-13 DIAGNOSIS — I10 ESSENTIAL HYPERTENSION: ICD-10-CM

## 2022-11-14 RX ORDER — AMLODIPINE BESYLATE 10 MG/1
TABLET ORAL
Qty: 90 TABLET | Refills: 1 | Status: SHIPPED | OUTPATIENT
Start: 2022-11-14

## 2023-04-25 NOTE — PROGRESS NOTES
Assessment and Plan:     Problem List Items Addressed This Visit        Other    Medicare annual wellness visit, subsequent - Primary     · Discussed immunizations due, declines        Other Visit Diagnoses     BMI 27 0-27 9,adult        Screening for diabetes mellitus (DM)        Relevant Orders    Basic metabolic panel    Screening for nephropathy        Relevant Orders    Basic metabolic panel           Preventive health issues were discussed with patient, and age appropriate screening tests were ordered as noted in patient's After Visit Summary  Personalized health advice and appropriate referrals for health education or preventive services given if needed, as noted in patient's After Visit Summary  Follow-up in 6 months and sooner as needed  History of Present Illness:     Patient presents for a Medicare Wellness Visit  Patient presents with her daughter today      hospitals   Patient Care Team:  Graciela Turner DO as PCP - General (Family Medicine)     Review of Systems:     Review of Systems     As noted in HPI      Problem List:     Patient Active Problem List   Diagnosis   • Essential hypertension   • Hyperlipidemia   • Osteopenia   • Primary osteoarthritis of both knees   • Carotid bruit   • Carotid artery stenosis   • History of cerebral aneurysm repair   • CAD (coronary artery disease)   • Cardiac murmur   • Medicare annual wellness visit, subsequent      Past Medical and Surgical History:     Past Medical History:   Diagnosis Date   • Hypertension      Past Surgical History:   Procedure Laterality Date   • COLONOSCOPY      2009, 2012   • NC I&D ISCHIORECTAL&/PERIRECTAL ABSCESS SPX N/A 2/17/2018    Procedure: INCISION AND DRAINAGE (I&D) PERIRECTAL;  Surgeon: iFnn Encarnacion MD;  Location: [facility];  Service: General      Family History:     Family History   Problem Relation Age of Onset   • Heart disease Mother       Social History:     Social History     Socioeconomic History   • Marital status:      Spouse name: None   • Number of children: None   • Years of education: None   • Highest education level: None   Occupational History   • None   Tobacco Use   • Smoking status: Every Day     Packs/day: 1 00     Types: Cigarettes   • Smokeless tobacco: Never   Substance and Sexual Activity   • Alcohol use: No   • Drug use: No   • Sexual activity: Not Currently   Other Topics Concern   • None   Social History Narrative    Caffeine use      Social Determinants of Health     Financial Resource Strain: Low Risk    • Difficulty of Paying Living Expenses: Not hard at all   Food Insecurity: Not on file   Transportation Needs: No Transportation Needs   • Lack of Transportation (Medical): No   • Lack of Transportation (Non-Medical): No   Physical Activity: Not on file   Stress: Not on file   Social Connections: Not on file   Intimate Partner Violence: Not on file   Housing Stability: Not on file      Medications and Allergies:     Current Outpatient Medications   Medication Sig Dispense Refill   • amLODIPine (NORVASC) 10 mg tablet TAKE 1 TABLET BY MOUTH EVERY DAY 90 tablet 1   • aspirin (ECOTRIN LOW STRENGTH) 81 mg EC tablet Take 81 mg by mouth daily     • Calcium 600-200 MG-UNIT per tablet Take by mouth     • moxifloxacin (VIGAMOX) 0 5 % ophthalmic solution        No current facility-administered medications for this visit  Allergies   Allergen Reactions   • Bee Venom Edema   • Codeine    • Influenza Vaccines    • Sulfa Antibiotics Hives   • Penicillins Rash      Immunizations:     Immunization History   Administered Date(s) Administered   • COVID-19 PFIZER VACCINE 0 3 ML IM 06/25/2021, 07/16/2021, 01/09/2022      Health Maintenance: There are no preventive care reminders to display for this patient  Topic Date Due   • COVID-19 Vaccine (4 - Booster for Pfizer series) 03/06/2022      Medicare Screening Tests and Risk Assessments:     Monicazaki Samuelsshelton is here for her Subsequent Wellness visit       Health Risk Assessment:   Patient rates overall health as very good  Patient feels that their physical health rating is same  Patient is satisfied with their life  Eyesight was rated as same  Hearing was rated as same  Patient feels that their emotional and mental health rating is same  Patients states they are never, rarely angry  Patient states they are never, rarely unusually tired/fatigued  Pain experienced in the last 7 days has been none  Patient states that she has experienced no weight loss or gain in last 6 months  Depression Screening:   PHQ-2 Score: 0      Fall Risk Screening: In the past year, patient has experienced: no history of falling in past year      Urinary Incontinence Screening:   Patient has leaked urine accidently in the last six months  Home Safety:  Patient does not have trouble with stairs inside or outside of their home  Patient has no working smoke alarms and has no working carbon monoxide detector  Home safety hazards include: none  Nutrition:   Current diet is Regular  Medications:   Patient is currently taking over-the-counter supplements  OTC medications include: see medication list  Patient is able to manage medications  Activities of Daily Living (ADLs)/Instrumental Activities of Daily Living (IADLs):   Walk and transfer into and out of bed and chair?: Yes  Dress and groom yourself?: Yes    Bathe or shower yourself?: Yes    Feed yourself? Yes  Do your laundry/housekeeping?: Yes  Manage your money, pay your bills and track your expenses?: Yes  Make your own meals?: Yes    Do your own shopping?: Yes    Previous Hospitalizations:   Any hospitalizations or ED visits within the last 12 months?: No      Advance Care Planning:   Living will: Yes    Durable POA for healthcare:  Yes    Advanced directive: Yes      Cognitive Screening:   Provider or family/friend/caregiver concerned regarding cognition?: No    PREVENTIVE SCREENINGS      Cardiovascular Screening:    General: History "Lipid Disorder and Patient Declines      Diabetes Screening:     General: Risks and Benefits Discussed    Due for: Blood Glucose      Colorectal Cancer Screening:     General: Screening Not Indicated      Breast Cancer Screening:     General: Risks and Benefits Discussed and Patient Declines      Cervical Cancer Screening:    General: Screening Not Indicated      Osteoporosis Screening:    General: Risks and Benefits Discussed and Patient Declines      Abdominal Aortic Aneurysm (AAA) Screening:    Risk factors include: family history of AAA        General: Risks and Benefits Discussed and Patient Declines      Lung Cancer Screening:     General: Screening Not Indicated      Hepatitis C Screening:    General: Risks and Benefits Discussed    Hep C Screening Accepted: No     Screening, Brief Intervention, and Referral to Treatment (SBIRT)    Screening  Typical number of drinks in a day: 0    Single Item Drug Screening:  How often have you used an illegal drug (including marijuana) or a prescription medication for non-medical reasons in the past year? never    Single Item Drug Screen Score: 0  Interpretation: Negative screen for possible drug use disorder    Other Counseling Topics:   Car/seat belt/driving safety and calcium and vitamin D intake       /80 (BP Location: Left arm, Patient Position: Sitting, Cuff Size: Large)   Pulse 100   Temp (!) 97 2 °F (36 2 °C) (Temporal)   Ht 5' 3 5\" (1 613 m)   Wt 70 7 kg (155 lb 12 8 oz)   SpO2 94%   BMI 27 17 kg/m²     Yudy Jules, DO  "

## 2023-04-26 ENCOUNTER — OFFICE VISIT (OUTPATIENT)
Dept: FAMILY MEDICINE CLINIC | Facility: MEDICAL CENTER | Age: 88
End: 2023-04-26

## 2023-04-26 VITALS
TEMPERATURE: 97.2 F | OXYGEN SATURATION: 94 % | SYSTOLIC BLOOD PRESSURE: 144 MMHG | DIASTOLIC BLOOD PRESSURE: 80 MMHG | BODY MASS INDEX: 26.6 KG/M2 | HEIGHT: 64 IN | WEIGHT: 155.8 LBS | HEART RATE: 100 BPM

## 2023-04-26 DIAGNOSIS — Z13.1 SCREENING FOR DIABETES MELLITUS (DM): ICD-10-CM

## 2023-04-26 DIAGNOSIS — Z00.00 MEDICARE ANNUAL WELLNESS VISIT, SUBSEQUENT: Primary | ICD-10-CM

## 2023-04-26 DIAGNOSIS — Z13.89 SCREENING FOR NEPHROPATHY: ICD-10-CM

## 2023-05-10 DIAGNOSIS — I10 ESSENTIAL HYPERTENSION: ICD-10-CM

## 2023-05-10 RX ORDER — AMLODIPINE BESYLATE 10 MG/1
TABLET ORAL
Qty: 90 TABLET | Refills: 1 | Status: SHIPPED | OUTPATIENT
Start: 2023-05-10

## 2023-05-15 ENCOUNTER — TELEPHONE (OUTPATIENT)
Dept: FAMILY MEDICINE CLINIC | Facility: MEDICAL CENTER | Age: 88
End: 2023-05-15

## 2023-05-15 NOTE — TELEPHONE ENCOUNTER
Pt called to request advice  She was pulling weeds over the weekend  Now her left hand fingers are swollen  She can't get her ring off  She has tried vaseline and cold compresses, but she can't get it off  She hasn't tried anything else for the swelling  Offered appt with Dr Barbie Mcleod tomorrow afternoon as her schedule is filled for today, but pt didn't want to wait until tomorrow       Routed to Clinical - please triage

## 2023-05-15 NOTE — TELEPHONE ENCOUNTER
Triaged -s/w Brittnee Latham, daughter, states she has poison Ivy  C/o ring finger is swollen and itchy  Gave her Benadryl which seemed to help, did not give it to her today  appt advised  Will continue the Benadryl, cool compresses and Calamine lotion, suggested ibuprofen for the swelling as well   appt scheduled tomorrow afternoon

## 2023-06-25 PROBLEM — Z00.00 MEDICARE ANNUAL WELLNESS VISIT, SUBSEQUENT: Status: RESOLVED | Noted: 2023-04-26 | Resolved: 2023-06-25

## 2023-11-01 ENCOUNTER — TELEPHONE (OUTPATIENT)
Dept: ADMINISTRATIVE | Facility: OTHER | Age: 88
End: 2023-11-01

## 2023-11-01 NOTE — TELEPHONE ENCOUNTER
11/01/23 12:28 PM    Patient contacted (spoke with patient) to bring Advance Directive, POLST, or Living Will document to next scheduled pcp visit. Thank you.   Nora Kunz  [facility]

## 2023-11-01 NOTE — TELEPHONE ENCOUNTER
The patient called because she does not understand the reason why she has to bring those documents to her appointment tomorrow. During the procedure of transferring the patient's call to the clinic, the patient disconnected the call.        Please contact the patient

## 2023-11-03 DIAGNOSIS — I10 ESSENTIAL HYPERTENSION: ICD-10-CM

## 2023-11-03 RX ORDER — AMLODIPINE BESYLATE 10 MG/1
TABLET ORAL
Qty: 30 TABLET | Refills: 0 | Status: SHIPPED | OUTPATIENT
Start: 2023-11-03

## 2023-11-27 DIAGNOSIS — I10 ESSENTIAL HYPERTENSION: ICD-10-CM

## 2023-11-27 RX ORDER — AMLODIPINE BESYLATE 10 MG/1
TABLET ORAL
Qty: 90 TABLET | Refills: 0 | Status: SHIPPED | OUTPATIENT
Start: 2023-11-27

## 2024-02-23 DIAGNOSIS — I10 ESSENTIAL HYPERTENSION: ICD-10-CM

## 2024-02-23 RX ORDER — AMLODIPINE BESYLATE 10 MG/1
TABLET ORAL
Qty: 90 TABLET | Refills: 0 | Status: SHIPPED | OUTPATIENT
Start: 2024-02-23

## 2024-02-23 NOTE — TELEPHONE ENCOUNTER
Called and spoke with pt to make her aware of courtesy refill.  Scheduled appt with Dr Ariza for 3/5/24

## 2024-03-04 ENCOUNTER — TELEPHONE (OUTPATIENT)
Dept: ADMINISTRATIVE | Facility: OTHER | Age: 89
End: 2024-03-04

## 2024-03-04 NOTE — TELEPHONE ENCOUNTER
03/04/24 12:18 PM    Patient contacted (spoke with patient) to bring Advance Directive, POLST, or Living Will document to next scheduled pcp visit.    Thank you.  Padmini Ramos MA  [facility]

## 2024-03-22 ENCOUNTER — TELEPHONE (OUTPATIENT)
Dept: ADMINISTRATIVE | Facility: OTHER | Age: 89
End: 2024-03-22

## 2024-03-22 NOTE — TELEPHONE ENCOUNTER
03/22/24 3:45 PM    Patient contacted (spoke with patient) to bring Advance Directive, POLST, or Living Will document to next scheduled pcp visit.    Thank you.  Wendi Nicholson [facility]

## 2024-05-21 ENCOUNTER — TELEPHONE (OUTPATIENT)
Dept: FAMILY MEDICINE CLINIC | Facility: MEDICAL CENTER | Age: 89
End: 2024-05-21

## 2024-05-21 NOTE — TELEPHONE ENCOUNTER
Appt. Made  ----- Message from Marisa GRIGSBY sent at 5/20/2024 10:53 AM EDT -----  Regarding: AWV  Patient due for AWV, please contact to schedule .

## 2024-05-22 DIAGNOSIS — I10 ESSENTIAL HYPERTENSION: ICD-10-CM

## 2024-05-22 RX ORDER — AMLODIPINE BESYLATE 10 MG/1
TABLET ORAL
Qty: 90 TABLET | Refills: 1 | Status: SHIPPED | OUTPATIENT
Start: 2024-05-22

## 2024-11-13 DIAGNOSIS — I10 ESSENTIAL HYPERTENSION: ICD-10-CM

## 2024-11-13 RX ORDER — AMLODIPINE BESYLATE 10 MG/1
10 TABLET ORAL DAILY
Qty: 30 TABLET | Refills: 0 | Status: SHIPPED | OUTPATIENT
Start: 2024-11-13

## 2024-12-05 DIAGNOSIS — I10 ESSENTIAL HYPERTENSION: ICD-10-CM

## 2024-12-06 RX ORDER — AMLODIPINE BESYLATE 10 MG/1
10 TABLET ORAL DAILY
Qty: 90 TABLET | Refills: 1 | Status: SHIPPED | OUTPATIENT
Start: 2024-12-06

## 2025-03-24 ENCOUNTER — TELEPHONE (OUTPATIENT)
Dept: FAMILY MEDICINE CLINIC | Facility: MEDICAL CENTER | Age: 89
End: 2025-03-24

## 2025-03-24 NOTE — TELEPHONE ENCOUNTER
----- Message from Marisa GRIGSBY sent at 2/13/2025  6:47 PM EST -----  Regarding: AWV  Patient due for AWV, please contact patient to schedule.  
S/w dtr, Keila, she said mother does not want to go to the dr. But thinks we can get her AWV done via a virtual visit.  Dtr agreeable to 4/30 at 11:40, just awaiting manager approval to make appt, and to send link.  
[Negative] : Heme/Lymph

## 2025-04-30 ENCOUNTER — TELEMEDICINE (OUTPATIENT)
Dept: FAMILY MEDICINE CLINIC | Facility: MEDICAL CENTER | Age: 89
End: 2025-04-30
Payer: MEDICARE

## 2025-04-30 DIAGNOSIS — Z00.00 MEDICARE ANNUAL WELLNESS VISIT, SUBSEQUENT: Primary | ICD-10-CM

## 2025-04-30 DIAGNOSIS — I10 ESSENTIAL HYPERTENSION: ICD-10-CM

## 2025-04-30 DIAGNOSIS — R73.03 PREDIABETES: ICD-10-CM

## 2025-04-30 PROCEDURE — G0439 PPPS, SUBSEQ VISIT: HCPCS | Performed by: STUDENT IN AN ORGANIZED HEALTH CARE EDUCATION/TRAINING PROGRAM

## 2025-04-30 NOTE — PROGRESS NOTES
Name: Shruthi Carpenter      : 1935      MRN: [id number]  Encounter Provider: Mary Ariza DO  Encounter Date: 2025   Encounter department: [facility] [place]  :  Assessment & Plan  Medicare annual wellness visit, subsequent         Prediabetes    Orders:    Hemoglobin A1C; Future    Essential hypertension  Currently on amlodipine 10 mg p.o. daily  Orders:    Comprehensive metabolic panel; Future      Depression Screening and Follow-up Plan: Patient was screened for depression during today's encounter. They screened negative with a PHQ-2 score of 0.      Tobacco Cessation Counseling: Tobacco cessation counseling was provided. The patient is sincerely urged to quit consumption of tobacco. She is not ready to quit tobacco.       Would like to see patient in office, discussed with daughter.  Daughter states she will able to bring her in around 2025, will schedule around that time.    Preventive health issues were discussed with patient, and age appropriate screening tests were ordered as noted in patient's After Visit Summary. Personalized health advice and appropriate referrals for health education or preventive services given if needed, as noted in patient's After Visit Summary.    History of Present Illness     HPI   Patient Care Team:  Mary Ariza DO as PCP - General (Family Medicine)    Review of Systems    As noted in HPI   Medical History Reviewed by provider this encounter:  Tobacco  Allergies  Meds  Problems  Med Hx  Surg Hx  Fam Hx       Annual Wellness Visit Questionnaire   Shruthi is here for her Subsequent Wellness visit.     Health Risk Assessment:   Patient rates overall health as very good. Patient feels that their physical health rating is same. Patient is very satisfied with their life. Eyesight was rated as slightly worse. Hearing was rated as same. Patient feels that their emotional and mental health rating is same. Patients states they are never, rarely angry.  Patient states they are sometimes unusually tired/fatigued. Pain experienced in the last 7 days has been none. Patient states that she has experienced no weight loss or gain in last 6 months. Has an optometrist     Depression Screening:   PHQ-2 Score: 0      Fall Risk Screening:   In the past year, patient has experienced: no history of falling in past year      Urinary Incontinence Screening:   Patient has not leaked urine accidently in the last six months.     Home Safety:  Patient does not have trouble with stairs inside or outside of their home. Patient has working smoke alarms and has working carbon monoxide detector. Home safety hazards include: none.     Nutrition:   Current diet is Regular.     Medications:   Patient is currently taking over-the-counter supplements. OTC medications include: see medication list. Patient is not able to manage medications. Daughter sets them up for the week.      Activities of Daily Living (ADLs)/Instrumental Activities of Daily Living (IADLs):   Walk and transfer into and out of bed and chair?: Yes  Dress and groom yourself?: Yes    Bathe or shower yourself?: Yes    Feed yourself? Yes  Do your laundry/housekeeping?: Yes  Manage your money, pay your bills and track your expenses?: Yes  Make your own meals?: Yes    Do your own shopping?: Yes    Previous Hospitalizations:   Any hospitalizations or ED visits within the last 12 months?: No      Advance Care Planning:   Living will: Yes    Durable POA for healthcare: Yes    Advanced directive: Yes      Cognitive Screening:   Provider or family/friend/caregiver concerned regarding cognition?: No    Preventive Screenings      Cardiovascular Screening:    General: History Lipid Disorder, Risks and Benefits Discussed and Patient Declines      Diabetes Screening:     General: Risks and Benefits Discussed    Due for: Blood Glucose      Colorectal Cancer Screening:     General: Screening Not Indicated      Breast Cancer Screening:      General: Patient Declines      Cervical Cancer Screening:    General: Screening Not Indicated      Osteoporosis Screening:    General: Risks and Benefits Discussed and Patient Declines      Abdominal Aortic Aneurysm (AAA) Screening:    Risk factors include: family history of AAA        General: Screening Current      Lung Cancer Screening:     General: Screening Not Indicated      Hepatitis C Screening:    General: Risks and Benefits Discussed and Patient Declines    Immunizations:  - Immunizations due: Prevnar 20, Tdap and Zoster (Shingrix)  - The patient declines recommended vaccines currently despite my recommendations      Screening, Brief Intervention, and Referral to Treatment (SBIRT)     Screening  Typical number of drinks in a day: 0  Typical number of drinks in a week: 0  Interpretation: Low risk drinking behavior.    Single Item Drug Screening:  How often have you used an illegal drug (including marijuana) or a prescription medication for non-medical reasons in the past year? never    Single Item Drug Screen Score: 0  Interpretation: Negative screen for possible drug use disorder    Other Counseling Topics:   Car/seat belt/driving safety and calcium and vitamin D intake.     Social Drivers of Health     Financial Resource Strain: Low Risk  (4/25/2023)    Overall Financial Resource Strain (CARDIA)     Difficulty of Paying Living Expenses: Not hard at all   Food Insecurity: No Food Insecurity (4/30/2025)    Hunger Vital Sign     Worried About Running Out of Food in the Last Year: Never true     Ran Out of Food in the Last Year: Never true   Transportation Needs: No Transportation Needs (4/30/2025)    PRAPARE - Transportation     Lack of Transportation (Medical): No     Lack of Transportation (Non-Medical): No   Housing Stability: Low Risk  (4/30/2025)    Housing Stability Vital Sign     Unable to Pay for Housing in the Last Year: No     Number of Times Moved in the Last Year: 0     Homeless in the Last Year:  No   Utilities: Not At Risk (4/30/2025)    [facility] Utilities     Threatened with loss of utilities: No     No results found.    Objective   There were no vitals taken for this visit.    Physical Exam    It was my intent to perform this visit via video technology but the patient was not able to do a video connection so the visit was completed via audio telephone only.    Administrative Statements   Encounter provider Mary Ariza, DO    The Patient is located at Home and in the following state in which I hold an active license [place].    The patient was identified by name and date of birth. Shruthi Carpenter was informed that this is a telemedicine visit and that the visit is being conducted through the Epic Embedded platform. She agrees to proceed..  My office door was closed. No one else was in the room.  She acknowledged consent and understanding of privacy and security of the video platform. The patient has agreed to participate and understands they can discontinue the visit at any time.    I have spent a total time of 15 minutes in caring for this patient on the day of the visit/encounter including Documenting in the medical record, Reviewing/placing orders in the medical record (including tests, medications, and/or procedures), and Obtaining or reviewing history  , not including the time spent for establishing the audio/video connection.

## 2025-06-04 DIAGNOSIS — I10 ESSENTIAL HYPERTENSION: ICD-10-CM

## 2025-06-04 RX ORDER — AMLODIPINE BESYLATE 10 MG/1
10 TABLET ORAL DAILY
Qty: 90 TABLET | Refills: 1 | Status: SHIPPED | OUTPATIENT
Start: 2025-06-04

## (undated) DEVICE — CURITY PLAIN PACKING STRIP: Brand: CURITY

## (undated) DEVICE — INTENDED FOR TISSUE SEPARATION, AND OTHER PROCEDURES THAT REQUIRE A SHARP SURGICAL BLADE TO PUNCTURE OR CUT.: Brand: BARD-PARKER SAFETY BLADES SIZE 15, STERILE

## (undated) DEVICE — PLUMEPEN PRO 10FT

## (undated) DEVICE — GLOVE SRG BIOGEL ECLIPSE 7.5

## (undated) DEVICE — GLOVE INDICATOR PI UNDERGLOVE SZ 7.5 BLUE

## (undated) DEVICE — KERLIX BANDAGE ROLL: Brand: KERLIX

## (undated) DEVICE — STRL UNIVERSAL MINOR GENERAL: Brand: CARDINAL HEALTH

## (undated) DEVICE — POOLE SUCTION HANDLE: Brand: CARDINAL HEALTH

## (undated) DEVICE — TUBING SUCTION 5MM X 12 FT

## (undated) DEVICE — LIGHT HANDLE COVER SLEEVE DISP BLUE STELLAR